# Patient Record
Sex: FEMALE | Race: WHITE | NOT HISPANIC OR LATINO | Employment: OTHER | URBAN - METROPOLITAN AREA
[De-identification: names, ages, dates, MRNs, and addresses within clinical notes are randomized per-mention and may not be internally consistent; named-entity substitution may affect disease eponyms.]

---

## 2017-01-12 ENCOUNTER — GENERIC CONVERSION - ENCOUNTER (OUTPATIENT)
Dept: OTHER | Facility: OTHER | Age: 62
End: 2017-01-12

## 2017-02-07 ENCOUNTER — GENERIC CONVERSION - ENCOUNTER (OUTPATIENT)
Dept: OTHER | Facility: OTHER | Age: 62
End: 2017-02-07

## 2017-02-20 ENCOUNTER — GENERIC CONVERSION - ENCOUNTER (OUTPATIENT)
Dept: OTHER | Facility: OTHER | Age: 62
End: 2017-02-20

## 2017-02-21 ENCOUNTER — ALLSCRIPTS OFFICE VISIT (OUTPATIENT)
Dept: OTHER | Facility: OTHER | Age: 62
End: 2017-02-21

## 2017-03-18 ENCOUNTER — OFFICE VISIT (OUTPATIENT)
Dept: URGENT CARE | Facility: MEDICAL CENTER | Age: 62
End: 2017-03-18
Payer: COMMERCIAL

## 2017-03-18 ENCOUNTER — HOSPITAL ENCOUNTER (EMERGENCY)
Facility: HOSPITAL | Age: 62
Discharge: HOME | End: 2017-03-18
Attending: EMERGENCY MEDICINE
Payer: COMMERCIAL

## 2017-03-18 VITALS
RESPIRATION RATE: 18 BRPM | TEMPERATURE: 98.2 F | HEART RATE: 64 BPM | DIASTOLIC BLOOD PRESSURE: 77 MMHG | WEIGHT: 128 LBS | SYSTOLIC BLOOD PRESSURE: 147 MMHG | OXYGEN SATURATION: 97 %

## 2017-03-18 DIAGNOSIS — N39.0 ACUTE URINARY TRACT INFECTION: ICD-10-CM

## 2017-03-18 DIAGNOSIS — R19.7 ACUTE DIARRHEA: Primary | ICD-10-CM

## 2017-03-18 DIAGNOSIS — E86.0 DEHYDRATION: ICD-10-CM

## 2017-03-18 LAB
ALBUMIN SERPL BCP-MCNC: 3.8 G/DL (ref 3.5–5)
ALP SERPL-CCNC: 69 U/L (ref 46–116)
ALT SERPL W P-5'-P-CCNC: 21 U/L (ref 12–78)
ANION GAP SERPL CALCULATED.3IONS-SCNC: 9 MMOL/L (ref 4–13)
AST SERPL W P-5'-P-CCNC: 19 U/L (ref 5–45)
BACTERIA UR QL AUTO: ABNORMAL /HPF
BASOPHILS # BLD AUTO: 0.01 THOUSANDS/ΜL (ref 0–0.1)
BASOPHILS NFR BLD AUTO: 0 % (ref 0–1)
BILIRUB SERPL-MCNC: 0.38 MG/DL (ref 0.2–1)
BILIRUB UR QL STRIP: NEGATIVE
BUN SERPL-MCNC: 13 MG/DL (ref 5–25)
CALCIUM SERPL-MCNC: 8.3 MG/DL (ref 8.3–10.1)
CHLORIDE SERPL-SCNC: 104 MMOL/L (ref 100–108)
CLARITY UR: CLEAR
CO2 SERPL-SCNC: 26 MMOL/L (ref 21–32)
COLOR UR: YELLOW
COLOR, POC: YELLOW
CREAT SERPL-MCNC: 0.87 MG/DL (ref 0.6–1.3)
EOSINOPHIL # BLD AUTO: 0.03 THOUSAND/ΜL (ref 0–0.61)
EOSINOPHIL NFR BLD AUTO: 1 % (ref 0–6)
ERYTHROCYTE [DISTWIDTH] IN BLOOD BY AUTOMATED COUNT: 13 % (ref 11.6–15.1)
GFR SERPL CREATININE-BSD FRML MDRD: >60 ML/MIN/1.73SQ M
GLUCOSE SERPL-MCNC: 74 MG/DL (ref 65–140)
GLUCOSE UR STRIP-MCNC: NEGATIVE MG/DL
HCT VFR BLD AUTO: 39.5 % (ref 34.8–46.1)
HGB BLD-MCNC: 14 G/DL (ref 11.5–15.4)
HGB UR QL STRIP.AUTO: NEGATIVE
KETONES UR STRIP-MCNC: NEGATIVE MG/DL
LACTATE SERPL-SCNC: 1.1 MMOL/L (ref 0.5–2)
LEUKOCYTE ESTERASE UR QL STRIP: ABNORMAL
LIPASE SERPL-CCNC: 141 U/L (ref 73–393)
LYMPHOCYTES # BLD AUTO: 2.06 THOUSANDS/ΜL (ref 0.6–4.47)
LYMPHOCYTES NFR BLD AUTO: 38 % (ref 14–44)
MCH RBC QN AUTO: 34.2 PG (ref 26.8–34.3)
MCHC RBC AUTO-ENTMCNC: 35.4 G/DL (ref 31.4–37.4)
MCV RBC AUTO: 97 FL (ref 82–98)
MONOCYTES # BLD AUTO: 0.45 THOUSAND/ΜL (ref 0.17–1.22)
MONOCYTES NFR BLD AUTO: 8 % (ref 4–12)
NEUTROPHILS # BLD AUTO: 2.83 THOUSANDS/ΜL (ref 1.85–7.62)
NEUTS SEG NFR BLD AUTO: 53 % (ref 43–75)
NITRITE UR QL STRIP: POSITIVE
NON-SQ EPI CELLS URNS QL MICRO: ABNORMAL /HPF
NRBC BLD AUTO-RTO: 0 /100 WBCS
PH UR STRIP.AUTO: 6 [PH] (ref 4.5–8)
PLATELET # BLD AUTO: 205 THOUSANDS/UL (ref 149–390)
PMV BLD AUTO: 10.5 FL (ref 8.9–12.7)
POTASSIUM SERPL-SCNC: 3.9 MMOL/L (ref 3.5–5.3)
PROT SERPL-MCNC: 7 G/DL (ref 6.4–8.2)
PROT UR STRIP-MCNC: NEGATIVE MG/DL
RBC # BLD AUTO: 4.09 MILLION/UL (ref 3.81–5.12)
RBC #/AREA URNS AUTO: ABNORMAL /HPF
SODIUM SERPL-SCNC: 139 MMOL/L (ref 136–145)
SP GR UR STRIP.AUTO: 1.01 (ref 1–1.03)
UROBILINOGEN UR QL STRIP.AUTO: 0.2 E.U./DL
WBC # BLD AUTO: 5.38 THOUSAND/UL (ref 4.31–10.16)
WBC #/AREA URNS AUTO: ABNORMAL /HPF

## 2017-03-18 PROCEDURE — 99285 EMERGENCY DEPT VISIT HI MDM: CPT

## 2017-03-18 PROCEDURE — G0382 LEV 3 HOSP TYPE B ED VISIT: HCPCS

## 2017-03-18 PROCEDURE — 83690 ASSAY OF LIPASE: CPT | Performed by: EMERGENCY MEDICINE

## 2017-03-18 PROCEDURE — 87086 URINE CULTURE/COLONY COUNT: CPT

## 2017-03-18 PROCEDURE — 87186 SC STD MICRODIL/AGAR DIL: CPT

## 2017-03-18 PROCEDURE — 83605 ASSAY OF LACTIC ACID: CPT | Performed by: EMERGENCY MEDICINE

## 2017-03-18 PROCEDURE — S9083 URGENT CARE CENTER GLOBAL: HCPCS

## 2017-03-18 PROCEDURE — 87077 CULTURE AEROBIC IDENTIFY: CPT

## 2017-03-18 PROCEDURE — 85025 COMPLETE CBC W/AUTO DIFF WBC: CPT | Performed by: EMERGENCY MEDICINE

## 2017-03-18 PROCEDURE — 93005 ELECTROCARDIOGRAM TRACING: CPT | Performed by: EMERGENCY MEDICINE

## 2017-03-18 PROCEDURE — 96361 HYDRATE IV INFUSION ADD-ON: CPT

## 2017-03-18 PROCEDURE — 81002 URINALYSIS NONAUTO W/O SCOPE: CPT | Performed by: EMERGENCY MEDICINE

## 2017-03-18 PROCEDURE — 81001 URINALYSIS AUTO W/SCOPE: CPT

## 2017-03-18 PROCEDURE — 36415 COLL VENOUS BLD VENIPUNCTURE: CPT | Performed by: EMERGENCY MEDICINE

## 2017-03-18 PROCEDURE — 96360 HYDRATION IV INFUSION INIT: CPT

## 2017-03-18 PROCEDURE — 80053 COMPREHEN METABOLIC PANEL: CPT | Performed by: EMERGENCY MEDICINE

## 2017-03-18 RX ORDER — CIPROFLOXACIN 500 MG/1
500 TABLET, FILM COATED ORAL 2 TIMES DAILY
Qty: 6 TABLET | Refills: 0 | Status: SHIPPED | OUTPATIENT
Start: 2017-03-18 | End: 2017-03-21

## 2017-03-18 RX ADMIN — SODIUM CHLORIDE 1000 ML: 0.9 INJECTION, SOLUTION INTRAVENOUS at 13:39

## 2017-03-19 LAB
ATRIAL RATE: 53 BPM
P AXIS: 35 DEGREES
PR INTERVAL: 146 MS
QRS AXIS: -4 DEGREES
QRSD INTERVAL: 108 MS
QT INTERVAL: 448 MS
QTC INTERVAL: 420 MS
T WAVE AXIS: 14 DEGREES
VENTRICULAR RATE: 53 BPM

## 2017-03-20 LAB — BACTERIA UR CULT: NORMAL

## 2017-03-28 ENCOUNTER — ALLSCRIPTS OFFICE VISIT (OUTPATIENT)
Dept: OTHER | Facility: OTHER | Age: 62
End: 2017-03-28

## 2017-03-28 ENCOUNTER — APPOINTMENT (OUTPATIENT)
Dept: LAB | Facility: HOSPITAL | Age: 62
End: 2017-03-28
Payer: COMMERCIAL

## 2017-03-28 DIAGNOSIS — N39.0 URINARY TRACT INFECTION: ICD-10-CM

## 2017-03-28 LAB
BILIRUB UR QL STRIP: NORMAL
CLARITY UR: NORMAL
COLOR UR: YELLOW
GLUCOSE (HISTORICAL): NORMAL
HGB UR QL STRIP.AUTO: NORMAL
KETONES UR STRIP-MCNC: NORMAL MG/DL
LEUKOCYTE ESTERASE UR QL STRIP: NORMAL
NITRITE UR QL STRIP: NORMAL
PH UR STRIP.AUTO: 5 [PH]
PROT UR STRIP-MCNC: NORMAL MG/DL
SP GR UR STRIP.AUTO: 1020
UROBILINOGEN UR QL STRIP.AUTO: NORMAL

## 2017-03-28 PROCEDURE — 87086 URINE CULTURE/COLONY COUNT: CPT

## 2017-03-28 PROCEDURE — 87186 SC STD MICRODIL/AGAR DIL: CPT

## 2017-03-28 PROCEDURE — 87077 CULTURE AEROBIC IDENTIFY: CPT

## 2017-03-30 LAB — BACTERIA UR CULT: NORMAL

## 2017-04-04 ENCOUNTER — TRANSCRIBE ORDERS (OUTPATIENT)
Dept: ADMINISTRATIVE | Facility: HOSPITAL | Age: 62
End: 2017-04-04

## 2017-04-04 ENCOUNTER — HOSPITAL ENCOUNTER (OUTPATIENT)
Dept: RADIOLOGY | Facility: MEDICAL CENTER | Age: 62
Discharge: HOME | End: 2017-04-04
Payer: COMMERCIAL

## 2017-04-04 DIAGNOSIS — A04.72 INTESTINAL INFECTION DUE TO CLOSTRIDIUM DIFFICILE: Primary | ICD-10-CM

## 2017-04-04 DIAGNOSIS — A04.72 INTESTINAL INFECTION DUE TO CLOSTRIDIUM DIFFICILE: ICD-10-CM

## 2017-04-04 PROCEDURE — 74020 HB X-RAY EXAM OF ABDOMEN (COMPLETE, WITH DECUBITUS/ERECT VIEWS): CPT

## 2017-04-07 ENCOUNTER — GENERIC CONVERSION - ENCOUNTER (OUTPATIENT)
Dept: OTHER | Facility: OTHER | Age: 62
End: 2017-04-07

## 2017-04-11 ENCOUNTER — GENERIC CONVERSION - ENCOUNTER (OUTPATIENT)
Dept: OTHER | Facility: OTHER | Age: 62
End: 2017-04-11

## 2017-06-06 ENCOUNTER — ALLSCRIPTS OFFICE VISIT (OUTPATIENT)
Dept: OTHER | Facility: OTHER | Age: 62
End: 2017-06-06

## 2017-06-06 DIAGNOSIS — E78.00 PURE HYPERCHOLESTEROLEMIA: ICD-10-CM

## 2017-06-06 DIAGNOSIS — F32.9 MAJOR DEPRESSIVE DISORDER, SINGLE EPISODE: ICD-10-CM

## 2017-06-06 DIAGNOSIS — D75.89 OTHER SPECIFIED DISEASES OF BLOOD AND BLOOD-FORMING ORGANS(289.89): ICD-10-CM

## 2017-06-29 ENCOUNTER — GENERIC CONVERSION - ENCOUNTER (OUTPATIENT)
Dept: OTHER | Facility: OTHER | Age: 62
End: 2017-06-29

## 2017-07-28 ENCOUNTER — GENERIC CONVERSION - ENCOUNTER (OUTPATIENT)
Dept: OTHER | Facility: OTHER | Age: 62
End: 2017-07-28

## 2017-09-06 ENCOUNTER — ALLSCRIPTS OFFICE VISIT (OUTPATIENT)
Dept: OTHER | Facility: OTHER | Age: 62
End: 2017-09-06

## 2017-11-06 ENCOUNTER — ALLSCRIPTS OFFICE VISIT (OUTPATIENT)
Dept: OTHER | Facility: OTHER | Age: 62
End: 2017-11-06

## 2017-11-07 NOTE — PROGRESS NOTES
Assessment  1  MDD (major depressive disorder), recurrent episode (296 30) (F33 9)   2  Microscopic colitis (558 9) (C32 049)    Plan  MDD (major depressive disorder), recurrent episode    · Follow-up visit in 4 Months Evaluation and Treatment  Physical  Status: Hold For - Scheduling   Requested for: [id number]    Discussion/Summary  Discussion Summary:   MDD - mild in severity  Now off on mirtazapine  She is motivated to discontinue sertraline 50mg daily  Advised decreasing dose to 25mg daily for two months then every other day for one month then every third day for one month then stop  Will see in 4 months  Call in between with any issues  microscopic colitis - continues to do well off budesonide  She has bloating that is improving off welchol  She remains on FODMAP diet and probiotics  She will follow up with [facility]P  Medication SE Review and Pt Understands Tx: Possible side effects of new medications were reviewed with the patient/guardian today  The treatment plan was reviewed with the patient/guardian  The patient/guardian understands and agrees with the treatment plan      Chief Complaint  Chief Complaint Chronic Condition [facility]: Patient is here today for follow up of chronic conditions described in HPI  History of Present Illness  HPI: 64yo female with depression, HLD, lumbar spondylosis, microscopic colitis here for follow up of depression  dose of mirtazapine was two days ago  She is still interested in tapering off sertraline  Reports she had difficulty sleeping initially when she was taking mirtazapine every other day but it has since improved  Appetite has not been affected  She gets HA but believes it is due to the weather  is off welchol and bloating has improved  She remains on a probiotic  Review of Systems  Complete-Female:   Constitutional: No fever, no chills, feels well, no tiredness, no recent weight gain or weight loss     Cardiovascular: No complaints of slow heart rate, no fast heart rate, no chest pain, no palpitations, no leg claudication, no lower extremity edema  Respiratory: No complaints of shortness of breath, no wheezing, no cough, no SOB on exertion, no orthopnea, no PND  Gastrointestinal: as noted in HPI  Genitourinary: No complaints of dysuria, no incontinence, no pelvic pain, no dysmenorrhea, no vaginal discharge or bleeding  Neurological: headache, but-- no dizziness  Psychiatric: as noted in HPI  Active Problems  1  Basal cell carcinoma of skin (173 91) (C44 91)   2  Hypercholesterolemia (272 0) (E78 00)   3  Macrocytosis (289 89) (D75 89)   4  MDD (major depressive disorder), recurrent episode (296 30) (F33 9)   5  Microscopic colitis (558 9) (K52 839)   6  Migraine headache (346 90) (G43 909)   7  Need for influenza vaccination (V04 81) (Z23)   8  Right bundle branch block (426 4) (I45 10)    Past Medical History  1  History of Blister of left heel, initial encounter (917 2) (E33 353O)   2  History of C  difficile diarrhea (008 45) (A04 72)   3  History of Chronic diarrhea (787 91) (K52 9)   4  Diarrhea (787 91) (R19 7)   5  History of Elevated CEA (795 81) (R97 0)   6  History of Fracture of wrist, left, sequela (905 2) (S62 102S)   7  History of Frequent bowel movements (787 99) (R19 4)   8  History of diarrhea (V12 79) (Z87 898)   9  History of dizziness (V13 89) (Z87 898)   10  History of gastroesophageal reflux (GERD) (V12 79) (Z87 19)   11  History of reactive airway disease (V12 69) (Z87 09)   12  History of rotator cuff tear (V13 59) (Z87 39)   13  History of Sleep disorder (780 50) (G47 9)   14  History of UTI due to Klebsiella species (599 0,041 3) (N39 0,B96 1)  Active Problems And Past Medical History Reviewed: The active problems and past medical history were reviewed and updated today  Surgical History  1  History of Biopsy Breast Percutaneous Needle Core   2  History of Complete Colonoscopy   3  History of Dilation And Curettage   4  History of Reported Hx Of Breast Surgery For Biopsy   5  History of Rotator Cuff Repair   6  History of Treatment Of Wrist Fracture  Surgical History Reviewed: The surgical history was reviewed and updated today  Family History  Mother    1  Family history of cardiac disorder (V17 49) (Z82 49)  Father    2  Family history of cardiac disorder (V17 49) (Z82 49)   3  Family history of congestive heart failure (V17 49) (Z82 49)   4  Family history of Stroke Syndrome (V17 1)  Family History Reviewed: The family history was reviewed and updated today  Social History   · Exercising Regularly   · Marital History - Currently    · Never A Smoker   · Occupation   · Retired From Work   · Social alcohol use (Z78 9)  Social History Reviewed: The social history was reviewed and is unchanged  Current Meds   1  Mirtazapine 7 5 MG Oral Tablet; Take 1 tablet by mouth at bedtime; Therapy: 09Aqm6193 to (Evaluate:22Uft8145)  Requested for: 01Dbx2849; Last Rx:94Gdu8479   Ordered   2  ProAir  (90 Base) MCG/ACT Inhalation Aerosol Solution; INHALE 2 PUFFS EVERY 4-6   HOURS, SPACED 60 SECONDS APART; Therapy: 48FPW1729 to (Evaluate:48Ego0453)  Requested for: 69Ipe7619; Last Rx:07Wpj4971   Ordered   3  Probiotic Oral Capsule; take 1 capsule daily; Therapy: (Recorded:87Oni1015) to Recorded   4  Sertraline HCl - 50 MG Oral Tablet; TAKE 1 TABLET DAILY AS DIRECTED; Therapy: (94 31 11) to  Requested for: 17Npj6389 Recorded  Medication List Reviewed: The medication list was reviewed and updated today  Allergies  1  Cyclobenzaprine HCl TABS   2  Morphine Sulfate (PF) SOLN   3  Flagyl   4   FLU    Vitals  Vital Signs    Recorded: 98FFS7318 07:59AM   Temperature 98 F   Heart Rate 72   Respiration 16   Systolic 471   Diastolic 70   Height 4 ft 11 in   Weight 130 lb 8 0 oz   BMI Calculated 26 36   BSA Calculated 1 54   O2 Saturation 97     Physical Exam    Constitutional   General appearance: No acute distress, well appearing and well nourished  Ears, Nose, Mouth, and Throat   Oropharynx: Normal with no erythema, edema, exudate or lesions  Pulmonary   Respiratory effort: No increased work of breathing or signs of respiratory distress  Auscultation of lungs: Clear to auscultation  Cardiovascular   Auscultation of heart: Normal rate and rhythm, normal S1 and S2, without murmurs  Examination of extremities for edema and/or varicosities: Normal  -- post tib 2/4 bilaterally  Abdomen   Abdomen: Non-tender, no masses  Neurologic No focal deficit  Psychiatric   Orientation to person, place, and time: Normal     Mood and affect: Normal          Results/Data  PHQ-9 Adult Depression Screening 81YSD6191 08:17AM Jessica Jay Jay     Test Name Result Flag Reference   PHQ-9 Adult Depression Score 5     Over the last two weeks, how often have you been bothered by any of the following problems? Little interest or pleasure in doing things: Several days - 1  Feeling down, depressed, or hopeless: Several days - 1  Trouble falling or staying asleep, or sleeping too much: Several days - 1  Feeling tired or having little energy: Several days - 1  Poor appetite or over eating: Not at all - 0  Feeling bad about yourself - or that you are a failure or have let yourself or your family down: Several days - 1  Trouble concentrating on things, such as reading the newspaper or watching television: Not at all - 0  Moving or speaking so slowly that other people could have noticed   Or the opposite -  being so fidgety or restless that you have been moving around a lot more than usual: Not at all - 0  Thoughts that you would be better off dead, or of hurting yourself in some way: Not at all - 0   PHQ-9 Adult Depression Screening Negative     PHQ-9 Difficulty Level Somewhat difficult     PHQ-9 Severity Mild Depression       Signatures   Electronically signed by : Isak Schwab DO; Nov 6 2017  8:20AM EST (Author)

## 2018-01-09 ENCOUNTER — GENERIC CONVERSION - ENCOUNTER (OUTPATIENT)
Dept: INTERNAL MEDICINE CLINIC | Facility: CLINIC | Age: 63
End: 2018-01-09

## 2018-01-13 VITALS
OXYGEN SATURATION: 97 % | TEMPERATURE: 98 F | BODY MASS INDEX: 26.31 KG/M2 | SYSTOLIC BLOOD PRESSURE: 108 MMHG | HEIGHT: 59 IN | DIASTOLIC BLOOD PRESSURE: 70 MMHG | RESPIRATION RATE: 16 BRPM | HEART RATE: 72 BPM | WEIGHT: 130.5 LBS

## 2018-01-13 VITALS
DIASTOLIC BLOOD PRESSURE: 80 MMHG | RESPIRATION RATE: 16 BRPM | HEIGHT: 59 IN | SYSTOLIC BLOOD PRESSURE: 115 MMHG | BODY MASS INDEX: 25.83 KG/M2 | TEMPERATURE: 97.5 F | OXYGEN SATURATION: 98 % | HEART RATE: 60 BPM | WEIGHT: 128.13 LBS

## 2018-01-13 VITALS
OXYGEN SATURATION: 96 % | HEART RATE: 69 BPM | SYSTOLIC BLOOD PRESSURE: 105 MMHG | TEMPERATURE: 97.4 F | HEIGHT: 59 IN | WEIGHT: 133.13 LBS | BODY MASS INDEX: 26.84 KG/M2 | DIASTOLIC BLOOD PRESSURE: 60 MMHG | RESPIRATION RATE: 16 BRPM

## 2018-01-14 VITALS
OXYGEN SATURATION: 97 % | SYSTOLIC BLOOD PRESSURE: 130 MMHG | HEIGHT: 59 IN | TEMPERATURE: 97.7 F | WEIGHT: 131 LBS | RESPIRATION RATE: 16 BRPM | HEART RATE: 75 BPM | BODY MASS INDEX: 26.41 KG/M2 | DIASTOLIC BLOOD PRESSURE: 90 MMHG

## 2018-01-14 VITALS
OXYGEN SATURATION: 98 % | TEMPERATURE: 98.3 F | HEART RATE: 84 BPM | BODY MASS INDEX: 26.05 KG/M2 | SYSTOLIC BLOOD PRESSURE: 118 MMHG | DIASTOLIC BLOOD PRESSURE: 80 MMHG | WEIGHT: 129 LBS | RESPIRATION RATE: 12 BRPM

## 2018-01-17 NOTE — MISCELLANEOUS
Message   Recorded as Task   Date: 11/14/2016 09:02 AM, Created By: aYima Berry   Task Name: Medical Complaint Callback   Assigned To: Nesha Ellison   Regarding Patient: Zackary Morse, Status: Active   Comment:    Tawny Marshall - 14 Nov 2016 9:02 AM     TASK CREATED  pt is having alot of gi issues she would like you to call and refer her to [facility] to [facility] drs but her family  has ti call and refer her pt can be reached at [phone] and [facility] referal line is [phone]   Los Kwan - 15 Nov 2016 4:12 PM     TASK EDITED  Pt called for update  Patient reports she has had chronic diarrhea and has been followed by [facility] Dr Barbara Osei, last visit 11/2/16  Reports labs and stool tests were ordered  [facility] nurse called and reported she had an infection and was prescribed Flagyl and CIpro which she has not taken because she had adverse reactions to both in the past  Patient looked on her lab portal and reports she has positive C dif  She is not on abx at this time  Plan  Advised will need to obtain report of labs from [facility] and if in fact she is C dif positive will need to be on Vanco due to reaction from Flagyl (cramping and diarrhea)  Will also need to contact her gastro to make aware of results of tests that they had ordered       Signatures   Electronically signed by : Erin King DO; Nov 16 2016  5:18PM EST                       (Author)

## 2018-01-17 NOTE — PROGRESS NOTES
Assessment    1  Encounter for preventive health examination (V70 0) (Z00 00)    Plan  Health Maintenance    · Alcohol misuse can be a problem with advancing age ; Status:Complete;   Done:  68ZAW2903   · Drink plenty of fluids ; Status:Complete;   Done: 97HET8756   · Eat a low fat and low cholesterol diet ; Status:Complete;   Done: 80DQB5319   · Some eating tips that can help you lose weight ; Status:Complete;   Done: 13NFZ0466   · We recommend that you follow these steps to lower your risk of osteoporosis  ;  Status:Complete;   Done: 38QWU0284  Macrocytosis    · Follow-up visit in 6 months Evaluation and Treatment  Follow-up  Status: Hold For -  Scheduling  Requested for: 39Iks1687    Discussion/Summary  health maintenance visit healthy adult female Currently, she eats a healthy diet and has an adequate exercise regimen  cervical cancer screening is managed by LAURA Odonnell Breast cancer screening: the risks and benefits of breast cancer screening were discussed, monthly self breast exam was advised, mammogram is current and breast cancer screening is managed by Estela Hendrix  Colorectal cancer screening: the risks and benefits of colorectal cancer screening were discussed, colorectal cancer screening is current, the next colonoscopy is due 2024 and colorectal cancer screening is managed by [facility] Dr Doroteo Lee  The risks and benefits of immunizations were discussed, immunizations are needed and pt to obtain shingles vaccine at local pharmacy  Advice and education were given regarding nutrition, aerobic exercise, weight bearing exercise, weight loss, calcium supplements, vitamin D supplements, cardiovascular risk reduction and alcohol use  Patient discussion: discussed with the patient, 30 minute visit, greater than half of the time was spent on counseling  History of Present Illness  , Adult Female: The patient is being seen for a health maintenance evaluation  The last health maintenance visit was 2 year(s) ago  Social History: Household members include spouse  She is   Work status: occupation: Superintendent and retired  The patient has never smoked cigarettes  General Health: The patient's health since the last visit is described as good  She has regular dental visits (Followed by Dr Mounika Ledezma)  The patient brushes time(s) a day, flosses time(s) a day and reports her last dental visit was 2 weeks ago  She complains of vision problems (Followed by Dr Ethan Powell)  Vision care includes wearing glasses and an eye examination within the last year  She has hearing loss (Followed by ENT Dr Angela Tony)  hearing is slightly decreased   She doesn't wear a hearing aid  Immunizations status: not up to date The patient needs the following immunization(s): zoster vaccine  Lifestyle:  She consumes a diverse and healthy diet  (bland diet, eats chicken, broiled fish, no spicy foods)   She exercises regularly  She exercises 3 or more times per week  Exercise includes walking, aerobic conditioning, strength training and also walks her dog 4x a day  She does not use tobacco  She consumes alcohol  She reports occasional alcohol use and drinking 1 drinks per day  She typically drinks wine  Reproductive health: the patient is postmenopausal   Became postmenopausal at age 53yo  Screening: Breast cancer screening includes a mammogram performed 2/8/16 and Followed by Dr Enma Mgcowan  Colorectal cancer screening includes a colonoscopy performed 10/1/2014 and Followed by [facility] Dr Julianna Abdi, next colonoscopy due 10/2024  Metabolic screening includes lipid profile performed 5/17/16, glucose screening performed 5/17/16, thyroid function test performed 5/17/16 and DEXA performed 2/2016  Safety elements used: seat belt, bicycle helmet, sunscreen and smoke detector  HPI: 64yo female here with h/o depression, macrocytosis, diet controlled HLD and frequent BMs here for yearly physical  Reports she is sleeping better on mirtazapine   She was switched back to sertraline from lexapro but dose was lowered to 100mg once daily  Overall she feels well but continues to have GI issues  Review of Systems    Constitutional: No fever, no chills, feels well, no tiredness, no recent weight gain or weight loss  Cardiovascular: No complaints of slow heart rate, no fast heart rate, no chest pain, no palpitations, no leg claudication, no lower extremity edema  Respiratory: No complaints of shortness of breath, no wheezing, no cough, no SOB on exertion, no orthopnea, no PND  Gastrointestinal: abdominal pain and heartburn  Genitourinary: No complaints of dysuria, no incontinence, no pelvic pain, no dysmenorrhea, no vaginal discharge or bleeding  Musculoskeletal: as noted in HPI  Neurological: numbness and tingling  Psychiatric: as noted in HPI  Active Problems    1  Basal cell carcinoma of skin (173 91) (C44 91)   2  Depression (311) (F32 9)   3  Elevated CEA (795 81) (R97 0)   4  Frequent bowel movements (787 99) (R19 4)   5  Hypercholesterolemia (272 0) (E78 0)   6  Lumbar strain (847 2) (S39 012A)   7  Macrocytosis (289 89) (D75 89)   8  Migraine headache (346 90) (G43 909)   9  Reactive airway disease (493 90) (J45 909)   10  Right bundle branch block (426 4) (I45 10)   11   Sleep disorder (780 50) (G47 9)    Past Medical History    · Diarrhea (787 91) (R19 7)   · History of Fracture of wrist, left, sequela (905 2) (S62 102S)   · History of gastroesophageal reflux (GERD) (V12 79) (Z87 19)   · History of rotator cuff tear (V13 59) (Z87 39)    Surgical History    · History of Biopsy Breast Percutaneous Needle Core   · History of Complete Colonoscopy   · History of Dilation And Curettage   · History of Reported Hx Of Breast Surgery For Biopsy   · History of Rotator Cuff Repair   · History of Treatment Of Wrist Fracture    Family History  Mother    · Family history of cardiac disorder (V17 49) (Z80 55)  Father    · Family history of cardiac disorder (V17 49) (Z82 49)   · Family history of congestive heart failure (V17 49) (Z82 49)   · Family history of Stroke Syndrome (V17 1)    Social History    · Exercising Regularly   · Marital History - Currently    · Never A Smoker   · Occupation   ·    · Retired From Work   · Social alcohol use (Z78 9)   · wine - 4-5 glasses a week    Current Meds   1  Mirtazapine 15 MG Oral Tablet Recorded   2  ProAir  (90 Base) MCG/ACT Inhalation Aerosol Solution; INHALE 2 PUFFS   EVERY 4-6 HOURS, SPACED 60 SECONDS APART; Therapy: 86BJN9023 to (Evaluate:11May2016)  Requested for: 11Apr2016; Last   Rx:11Apr2016 Ordered   3  Probiotic Oral Capsule; Therapy: (Recorded:10May2016) to Recorded   4  Protonix 40 MG Oral Tablet Delayed Release; Therapy: (Recorded:31Oct2015) to Recorded   5  Sertraline HCl - 100 MG Oral Tablet; TAKE 1 TABLET DAILY; Therapy: (Recorded:19Aug2016) to Recorded    Allergies    1  Cyclobenzaprine HCl TABS   2  Morphine Sulfate (PF) SOLN   3  FLU    Vitals   Recorded: 19Aug2016 07:50AM Recorded: 82SUE5605 58:20KG   Systolic 056 mm Hg, LUE, Sitting    Diastolic 82 mm Hg, LUE, Sitting    Heart Rate 60    Height  4 ft 11 in   Weight  130 lb 6 oz   BMI Calculated  26 33 kg/m2   BSA Calculated  1 54 m2     Physical Exam    Constitutional   General appearance: No acute distress, well appearing and well nourished  Head and Face wears glasses  Eyes   Conjunctiva and lids: No swelling, erythema or discharge  Ears, Nose, Mouth, and Throat   External inspection of ears and nose: Normal     Otoscopic examination: Tympanic membranes translucent with normal light reflex  Canals patent without erythema  Hearing: Normal     Nasal mucosa, septum, and turbinates: Normal without edema or erythema  Lips, teeth, and gums: Normal, good dentition  Oropharynx: Normal with no erythema, edema, exudate or lesions  Neck   Neck: Supple, symmetric, trachea midline, no masses  Thyroid: Normal, no thyromegaly  Pulmonary   Respiratory effort: No increased work of breathing or signs of respiratory distress  Auscultation of lungs: Clear to auscultation  Cardiovascular   Auscultation of heart: Normal rate and rhythm, normal S1 and S2, no murmurs  Carotid pulses: 2+ bilaterally  Pedal pulses: 2+ bilaterally  Examination of extremities for edema and/or varicosities: Normal     Chest deferred by patient  Abdomen   Abdomen: Non-tender, no masses  The abdomen was flat  Bowel sounds were normal  The abdomen was soft and nontender  The abdomen was normal to percussion  Lymphatic   Palpation of lymph nodes in neck: No lymphadenopathy  Neurologic No focal deficit  Psychiatric   Orientation to person, place, and time: Normal     Mood and affect: Normal        Results/Data  PHQ-9 Adult Depression Screening 23Jhl0884 07:26AM User, [facility]     Test Name Result Flag Reference   PHQ-9 Adult Depression Score 7     Over the last two weeks, how often have you been bothered by any of the following problems? Little interest or pleasure in doing things: Several days - 1  Feeling down, depressed, or hopeless: Several days - 1  Trouble falling or staying asleep, or sleeping too much: More than half the days - 2  Feeling tired or having little energy: Several days - 1  Poor appetite or over eating: Not at all - 0  Feeling bad about yourself - or that you are a failure or have let yourself or your family down: More than half the days - 2  Trouble concentrating on things, such as reading the newspaper or watching television: Not at all - 0  Moving or speaking so slowly that other people could have noticed   Or the opposite -  being so fidgety or restless that you have been moving around a lot more than usual: Not at all - 0  Thoughts that you would be better off dead, or of hurting yourself in some way: Not at all - 0   PHQ-9 Adult Depression Screening Negative     PHQ-9 Difficulty Level Somewhat difficult     PHQ-9 Severity Mild Depression       PHQ-2 Adult Depression Screening 16Raq5179 07:25AM User, [facility]     Test Name Result Flag Reference   PHQ-2 Adult Depression Score 1     Over the last two weeks, how often have you been bothered by any of the following problems?   Little interest or pleasure in doing things: Not at all - 0  Feeling down, depressed, or hopeless: Several days - 1   PHQ-2 Adult Depression Screening Negative         Signatures   Electronically signed by : Nery Shay DO; Aug 19 2016  7:58AM EST                       (Author)

## 2018-01-17 NOTE — PSYCH
Behavioral Health Outpatient Intake    Referred By: DR Adali Ramirez  Intake Questions: there are no developmental disabilities  the patient does not have a hearing impairment  the patient does not have an ICM or CTT  patient is not taking injectable psychiatric medications  Employment: The patient is not employed  at [employer]  Emergency Contact Information:   Emergency Contact: MUSHTAQ HERNANDEZ   Relationship to Patient:    Phone Number: [phone]   Previous Psychiatric Treatment: She has not been previously seen by a psychiatrist     She has previously been seen by a therapist  Naveen Yanes 2014   History: no  service  She has not had combat service  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

## 2018-01-26 ENCOUNTER — TELEPHONE (OUTPATIENT)
Dept: INTERNAL MEDICINE CLINIC | Facility: CLINIC | Age: 63
End: 2018-01-26

## 2018-01-26 NOTE — TELEPHONE ENCOUNTER
[place]  She will back on Wednesday  She had a UA done at [facility] and they put her on an antibiotic  It didn't help and now they want her to see her primary care Dr   Because the original DR that prescribed the med is no longer there  She wants to know if you can prescribe another medication for her since she had a positive result?       Uses [facility] on [address]

## 2018-02-01 ENCOUNTER — OFFICE VISIT (OUTPATIENT)
Dept: URGENT CARE | Facility: MEDICAL CENTER | Age: 63
End: 2018-02-01
Payer: COMMERCIAL

## 2018-02-01 VITALS
HEART RATE: 76 BPM | DIASTOLIC BLOOD PRESSURE: 74 MMHG | OXYGEN SATURATION: 100 % | RESPIRATION RATE: 16 BRPM | WEIGHT: 126 LBS | TEMPERATURE: 97.3 F | SYSTOLIC BLOOD PRESSURE: 136 MMHG | HEIGHT: 59 IN | BODY MASS INDEX: 25.4 KG/M2

## 2018-02-01 DIAGNOSIS — R30.0 DYSURIA: Primary | ICD-10-CM

## 2018-02-01 LAB
SL AMB  POCT GLUCOSE, UA: NEGATIVE
SL AMB LEUKOCYTE ESTERASE,UA: NEGATIVE
SL AMB POCT BILIRUBIN,UA: NORMAL
SL AMB POCT BLOOD,UA: NORMAL
SL AMB POCT CLARITY,UA: NORMAL
SL AMB POCT COLOR,UA: YELLOW
SL AMB POCT KETONES,UA: NEGATIVE
SL AMB POCT NITRITE,UA: POSITIVE
SL AMB POCT PH,UA: 6
SL AMB POCT SPECIFIC GRAVITY,UA: 1.02

## 2018-02-01 PROCEDURE — 81002 URINALYSIS NONAUTO W/O SCOPE: CPT | Performed by: NURSE PRACTITIONER

## 2018-02-01 PROCEDURE — 87086 URINE CULTURE/COLONY COUNT: CPT | Performed by: NURSE PRACTITIONER

## 2018-02-01 PROCEDURE — 87186 SC STD MICRODIL/AGAR DIL: CPT | Performed by: NURSE PRACTITIONER

## 2018-02-01 PROCEDURE — 87077 CULTURE AEROBIC IDENTIFY: CPT | Performed by: NURSE PRACTITIONER

## 2018-02-01 PROCEDURE — 99214 OFFICE O/P EST MOD 30 MIN: CPT | Performed by: NURSE PRACTITIONER

## 2018-02-01 RX ORDER — SERTRALINE HYDROCHLORIDE 100 MG/1
50 TABLET, FILM COATED ORAL DAILY
COMMUNITY
End: 2018-02-05 | Stop reason: DRUGHIGH

## 2018-02-01 RX ORDER — NITROFURANTOIN 25; 75 MG/1; MG/1
100 CAPSULE ORAL 2 TIMES DAILY
Qty: 14 CAPSULE | Refills: 0 | Status: SHIPPED | OUTPATIENT
Start: 2018-02-01 | End: 2018-02-08

## 2018-02-01 RX ORDER — PHENAZOPYRIDINE HYDROCHLORIDE 100 MG/1
100 TABLET, FILM COATED ORAL 3 TIMES DAILY PRN
Qty: 10 TABLET | Refills: 0 | Status: SHIPPED | OUTPATIENT
Start: 2018-02-01 | End: 2018-02-03

## 2018-02-01 NOTE — PROGRESS NOTES
Assessment/Plan:  1  In office urinalysis + large blood and + nitrates  Previous urine culture + for klebsiella  2  Await urine culture  3  Follow up with PCP, Consider Urology referral if no improvement of symptoms  Patient Instructions   Maintain good hygiene  Encourage fluids and rest    May utilize Tylenol and Ibuprofen for discomfort  Complete course of antibiotics as prescribed  Be cautious pyridium will stain clothing and may change color of urine to orange  Await urine cultures  Follow up with PCP if symptoms persist              No problem-specific Assessment & Plan notes found for this encounter  Diagnoses and all orders for this visit:    Dysuria  -     POCT urine dip  -     nitrofurantoin (MACROBID) 100 mg capsule; Take 1 capsule (100 mg total) by mouth 2 (two) times a day for 7 days  -     phenazopyridine (PYRIDIUM) 100 mg tablet; Take 1 tablet (100 mg total) by mouth 3 (three) times a day as needed for bladder spasms for up to 2 days  -     Urine culture    Other orders  -     sertraline (ZOLOFT) 100 mg tablet; Take 50 mg by mouth daily          Subjective:      Patient ID: Gil Tineo is a 65 y o  female  Patient presents with urinary symptoms  Reports symptoms started prior to 01/08, was seen at [address] [place] [address], and had + UA with culture of klebsiella, was given one dose of monuril which she took on 1/12 with slight improvement of symptoms  Reports had repeat UA done on 01/18 and left for vacation to [facility], was then notified by physician office that that urine specimen was also + for klebsiella, though physician on call would not prescribe meds  Reports returned from [facility] yesterday and continues to be symptomatic including cloudy urine, frequency, urgency, lower abdominal discomfort, blood in urine  Denies burning, fevers, chills, N/V  + chronic diarrhea   GYN visit up to date and was reportedly normal          The following portions of the patient's history were reviewed and updated as appropriate: past social history, past surgical history and problem list     Review of Systems   Constitutional: Negative for chills and fever  Respiratory: Negative for chest tightness and shortness of breath  Cardiovascular: Negative for chest pain and leg swelling  Gastrointestinal: Positive for abdominal pain  Negative for diarrhea and nausea  Genitourinary: Positive for decreased urine volume, dysuria, frequency and hematuria  Negative for urgency  Musculoskeletal: Negative for myalgias  Skin: Negative for rash  Objective:     Physical Exam   Constitutional: She is oriented to person, place, and time  She appears well-developed and well-nourished  No distress  HENT:   Head: Normocephalic and atraumatic  Eyes: Conjunctivae are normal  Pupils are equal, round, and reactive to light  Cardiovascular: Normal rate, regular rhythm, normal heart sounds and intact distal pulses  No murmur heard  Pulmonary/Chest: Effort normal and breath sounds normal    Abdominal: Soft  Normal appearance and bowel sounds are normal  She exhibits no distension  There is no hepatosplenomegaly  There is tenderness in the suprapubic area  There is no rebound, no guarding and no CVA tenderness  Musculoskeletal: Normal range of motion  She exhibits no edema  Neurological: She is alert and oriented to person, place, and time  Skin: Skin is warm and dry  No rash noted  She is not diaphoretic  Psychiatric: She has a normal mood and affect  Nursing note and vitals reviewed          /74 (BP Location: Left arm, Patient Position: Sitting, Cuff Size: Standard)   Pulse 76   Temp (!) 97 3 °F (36 3 °C) (Tympanic)   Resp 16   Ht 4' 11" (1 499 m)   Wt 57 2 kg (126 lb)   SpO2 100%   BMI 25 45 kg/m²

## 2018-02-01 NOTE — PATIENT INSTRUCTIONS
Maintain good hygiene  Encourage fluids and rest    May utilize Tylenol and Ibuprofen for discomfort  Complete course of antibiotics as prescribed  Be cautious pyridium will stain clothing and may change color of urine to orange  Await urine cultures  Follow up with PCP if symptoms persist      Dysuria   WHAT YOU NEED TO KNOW:   Dysuria is difficulty urinating, or pain, burning, or discomfort with urination  Dysuria is usually a symptom of another problem  DISCHARGE INSTRUCTIONS:   Return to the emergency department if:   · You have severe back, side, or abdominal pain  · You have fever and shaking chills  · You vomit several times in a row  Contact your healthcare provider if:   · Your symptoms do not go away, even after treatment  · You have questions or concerns about your condition or care  Medicines:   · Medicines  may be given to help treat a bacterial infection or help decrease bladder spasms  · Take your medicine as directed  Contact your healthcare provider if you think your medicine is not helping or if you have side effects  Tell him of her if you are allergic to any medicine  Keep a list of the medicines, vitamins, and herbs you take  Include the amounts, and when and why you take them  Bring the list or the pill bottles to follow-up visits  Carry your medicine list with you in case of an emergency  Follow up with your healthcare provider as directed: Your healthcare provider may also refer you to a urologist or nephrologist to have additional testing  Write down your questions so you remember to ask them during your visits  Manage your dysuria:   · Drink more liquids  Liquids help flush out bacteria that may be causing an infection  Ask your healthcare provider how much liquid to drink each day and which liquids are best for you  · Take sitz baths as directed  Fill a bathtub with 4 to 6 inches of warm water   You may also use a sitz bath pan that fits over a toilet  Sit in the sitz bath for 20 minutes  Do this 2 to 3 times a day, or as directed  The warm water can help decrease pain and swelling  © 2017 [address] [employer] Information is for End User's use only and may not be sold, redistributed or otherwise used for commercial purposes  All illustrations and images included in CareNotes® are the copyrighted property of A D A M , Inc  or Orlando Sanabria  The above information is an  only  It is not intended as medical advice for individual conditions or treatments  Talk to your doctor, nurse or pharmacist before following any medical regimen to see if it is safe and effective for you

## 2018-02-03 LAB — BACTERIA UR CULT: ABNORMAL

## 2018-02-05 ENCOUNTER — OFFICE VISIT (OUTPATIENT)
Dept: INTERNAL MEDICINE CLINIC | Facility: CLINIC | Age: 63
End: 2018-02-05
Payer: COMMERCIAL

## 2018-02-05 VITALS
HEART RATE: 63 BPM | DIASTOLIC BLOOD PRESSURE: 70 MMHG | SYSTOLIC BLOOD PRESSURE: 128 MMHG | BODY MASS INDEX: 26.21 KG/M2 | WEIGHT: 130 LBS | OXYGEN SATURATION: 98 % | TEMPERATURE: 98.3 F | HEIGHT: 59 IN

## 2018-02-05 DIAGNOSIS — F33.9 EPISODE OF RECURRENT MAJOR DEPRESSIVE DISORDER, UNSPECIFIED DEPRESSION EPISODE SEVERITY (HCC): ICD-10-CM

## 2018-02-05 DIAGNOSIS — N39.0 UTI DUE TO KLEBSIELLA SPECIES: Primary | ICD-10-CM

## 2018-02-05 DIAGNOSIS — B96.89 UTI DUE TO KLEBSIELLA SPECIES: Primary | ICD-10-CM

## 2018-02-05 DIAGNOSIS — F33.9 EPISODE OF RECURRENT MAJOR DEPRESSIVE DISORDER, UNSPECIFIED DEPRESSION EPISODE SEVERITY (HCC): Primary | ICD-10-CM

## 2018-02-05 PROBLEM — N90.89 VULVAR IRRITATION: Status: ACTIVE | Noted: 2017-10-03

## 2018-02-05 PROBLEM — N64.9 BREAST DISORDER: Status: ACTIVE | Noted: 2018-02-05

## 2018-02-05 PROBLEM — K21.9 GERD (GASTROESOPHAGEAL REFLUX DISEASE): Status: ACTIVE | Noted: 2017-04-18

## 2018-02-05 PROBLEM — K52.839 MICROSCOPIC COLITIS: Status: ACTIVE | Noted: 2017-02-20

## 2018-02-05 PROBLEM — G47.00 INSOMNIA: Status: ACTIVE | Noted: 2018-02-05

## 2018-02-05 PROBLEM — Z78.0 POSTMENOPAUSAL: Status: ACTIVE | Noted: 2018-02-05

## 2018-02-05 PROCEDURE — 99214 OFFICE O/P EST MOD 30 MIN: CPT | Performed by: INTERNAL MEDICINE

## 2018-02-05 RX ORDER — SULFAMETHOXAZOLE AND TRIMETHOPRIM 800; 160 MG/1; MG/1
1 TABLET ORAL EVERY 12 HOURS SCHEDULED
Qty: 14 TABLET | Refills: 0 | Status: SHIPPED | OUTPATIENT
Start: 2018-02-05 | End: 2018-02-12

## 2018-02-05 NOTE — PROGRESS NOTES
Assessment/Plan:    MDD (major depressive disorder), recurrent episode ([facility] [address] )  Will continue on present dose of sertraline 50mg daily for now, med renewed  Reassess at next visit  1  UTI due to Klebsiella species  sulfamethoxazole-trimethoprim (BACTRIM DS) 800-160 mg per tablet    UC susceptibilities reviewed  Has h/o C dif, would avoid fluorquinolones, cephalosporin and PCN  D/c nitrofurantoin as it is intermediate susc  Start Bactrim  2  Episode of recurrent major depressive disorder, unspecified depression episode severity (HCC)  sertraline (ZOLOFT) 50 mg tablet       Subjective:      Patient ID: Moody Qiu is a 65 y o  female  HPI     Reports she did not decrease her dose of sertraline as originally planned  She remains on sertraline 50mg daily  She had a horrible holiday and had to put her dog down  She reports abd bloating, hematuria  UA showed Klebsiella, she was rx monuril  Had repeat UA done prior going to [facility], she was starting to feel better but while in [facility], she contacted [place] and was told it was positive again for Klebsiella but was not given any rx  When returning to [facility], she still had hematuria and therefore went to Urgent Care, urine was positive and rx nitrofurantoin four days ago  She was advised today that her UC was pos for Klebsiella  She still has lower abd bloating, hematuria but denies lower abd pain, dysuria, fever or nausea  The following portions of the patient's history were reviewed and updated as appropriate: allergies, current medications, past family history, past medical history, past social history, past surgical history and problem list     Review of Systems   Constitutional: Negative for fever  Respiratory: Negative  Cardiovascular: Negative  Genitourinary:        Hematuria   Psychiatric/Behavioral: Positive for dysphoric mood           Objective:  /70 (BP Location: Left arm, Patient Position: Sitting, Cuff Size: Adult)   Pulse 63 Temp 98 3 °F (36 8 °C) (Tympanic)   Ht 4' 11" (1 499 m)   Wt 59 kg (130 lb)   SpO2 98%   BMI 26 26 kg/m²        Physical Exam   Constitutional: She is oriented to person, place, and time  No distress  Cardiovascular: Normal rate and regular rhythm  Pulmonary/Chest: Effort normal    Abdominal: Soft  Bowel sounds are normal  She exhibits no distension  There is no tenderness  Neurological: She is oriented to person, place, and time  Psychiatric: She has a normal mood and affect  Vitals reviewed

## 2018-02-05 NOTE — ASSESSMENT & PLAN NOTE
Will continue on present dose of sertraline 50mg daily for now, med renewed  Reassess at next visit

## 2018-03-13 ENCOUNTER — VBI (OUTPATIENT)
Dept: ADMINISTRATIVE | Facility: OTHER | Age: 63
End: 2018-03-13

## 2018-03-13 NOTE — TELEPHONE ENCOUNTER
Drake Goldstein    ED Visit Information     Ed visit date: 2/26/18  Diagnosis Description: OTHER SPECIFIED ABNORMAL UTERINE AND VAGINAL BLEEDING  In Network? No  Discharge status: Home  Discharged with meds ? NA  Number of ED visits to date: 1  ED Severity:4     Outreach Information    Outreach successful: N/A  Date letter mailed:3/13/18  Date Finalized:3/13/18    Care Coordination    Follow up appointment with pcp: no none  Transportation issues ?  NA    [employer] type:  7 [address] PCP:  Yes

## 2018-04-05 ENCOUNTER — OFFICE VISIT (OUTPATIENT)
Dept: INTERNAL MEDICINE CLINIC | Facility: CLINIC | Age: 63
End: 2018-04-05
Payer: COMMERCIAL

## 2018-04-05 VITALS
WEIGHT: 131.8 LBS | DIASTOLIC BLOOD PRESSURE: 70 MMHG | SYSTOLIC BLOOD PRESSURE: 110 MMHG | HEART RATE: 64 BPM | RESPIRATION RATE: 16 BRPM | HEIGHT: 60 IN | BODY MASS INDEX: 25.87 KG/M2 | TEMPERATURE: 98.2 F | OXYGEN SATURATION: 97 %

## 2018-04-05 DIAGNOSIS — Z11.59 NEED FOR HEPATITIS C SCREENING TEST: ICD-10-CM

## 2018-04-05 DIAGNOSIS — Z00.00 ROUTINE ADULT HEALTH MAINTENANCE: Primary | ICD-10-CM

## 2018-04-05 DIAGNOSIS — E78.00 HYPERCHOLESTEROLEMIA: ICD-10-CM

## 2018-04-05 PROCEDURE — 99396 PREV VISIT EST AGE 40-64: CPT | Performed by: INTERNAL MEDICINE

## 2018-04-05 RX ORDER — ALBUTEROL SULFATE 90 UG/1
2 AEROSOL, METERED RESPIRATORY (INHALATION)
COMMUNITY
Start: 2011-03-09 | End: 2018-09-13 | Stop reason: HOSPADM

## 2018-04-05 NOTE — PROGRESS NOTES
Assessment/Plan:    No problem-specific Assessment & Plan notes found for this encounter  1  Routine adult health maintenance     2  Need for hepatitis C screening test  Hepatitis C antibody   3  Hypercholesterolemia  Lipid panel    TSH, 3rd generation with T4 reflex   surveillance DXA deferred    Subjective:      Patient ID: Seamus Avila is a 65 y o  female  HPI  62yo female with MDD, microscopic colitis, recurrent C dif, HLD and lumbar spondylosis here for yearly physical   She has weaned off sertraline  Last dental visit several weeks ago  Last eye visit 2 weeks ago, wears glasses    Immunization History   Administered Date(s) Administered    Influenza Quadrivalent, 6-35 Months IM 09/06/2017    Influenza TIV (IM) 11/06/2011, 09/23/2013, 09/23/2014    Tdap 11/17/2015       Lifestyle:    Diet she is gradually reintroducing vegetables  Physical activity goes to the gym twice weekly with , does classes with mobility and stretching    reports that she drinks about 1 2 oz of alcohol per week   reports that she has never smoked  She has never used smokeless tobacco     reports that she does not use drugs  Reproductive:     has no sexual activity history on file  Menstruation history     No LMP recorded  Patient is postmenopausal    Pregnancy history     Cancer Screenings:   Last PAP 1/2018, followed by Dr Zainab Romero   Last mammogram 2/8/18 at [facility]   Colonoscopy 2017 at [address] [place] [address] 2/2016 - normal    The following portions of the patient's history were reviewed and updated as appropriate: allergies, current medications, past family history, past medical history, past social history, past surgical history and problem list     Current Outpatient Prescriptions:     albuterol (PROAIR HFA) 90 mcg/act inhaler, Inhale 2 puffs, Disp: , Rfl:     Probiotic Product (PROBIOTIC-10 PO), Take by mouth, Disp: , Rfl:     Review of Systems   Constitutional: Negative      HENT: Positive for postnasal drip and rhinorrhea  Respiratory: Negative  Cardiovascular: Negative  Gastrointestinal:        Abdominal bloating   Genitourinary: Positive for vaginal bleeding  Negative for vaginal discharge and vaginal pain  Neurological: Positive for headaches  Negative for dizziness  Psychiatric/Behavioral: Positive for sleep disturbance  Negative for dysphoric mood  The patient is not nervous/anxious  Objective:    /70 (BP Location: Left arm, Patient Position: Sitting)   Pulse 64   Temp 98 2 °F (36 8 °C)   Resp 16   Ht 5' (1 524 m)   Wt 59 8 kg (131 lb 12 8 oz)   SpO2 97%   BMI 25 74 kg/m²      Physical Exam   Constitutional: She is oriented to person, place, and time  She appears well-developed and well-nourished  No distress  HENT:   Right Ear: External ear normal    Left Ear: External ear normal    Nose: Nose normal    Mouth/Throat: Oropharynx is clear and moist  No oropharyngeal exudate  Eyes: Conjunctivae and EOM are normal  Pupils are equal, round, and reactive to light  Neck: Neck supple  No thyromegaly present  Cardiovascular: Normal rate, regular rhythm, normal heart sounds and intact distal pulses  Pulmonary/Chest: Effort normal and breath sounds normal  No respiratory distress  She has no wheezes  Abdominal: Soft  Bowel sounds are normal  She exhibits no distension  There is no tenderness  Neurological: She is oriented to person, place, and time  Skin: Skin is dry  Psychiatric: She has a normal mood and affect  Her behavior is normal    Vitals reviewed        PHQ-9 Depression Screening    PHQ-9:    Frequency of the following problems over the past two weeks:       Little interest or pleasure in doing things:  0 - not at all  Feeling down, depressed, or hopeless:  0 - not at all  PHQ-2 Score:  0

## 2018-08-02 ENCOUNTER — OFFICE VISIT (OUTPATIENT)
Dept: URGENT CARE | Facility: MEDICAL CENTER | Age: 63
End: 2018-08-02
Payer: COMMERCIAL

## 2018-08-02 ENCOUNTER — APPOINTMENT (OUTPATIENT)
Dept: LAB | Facility: CLINIC | Age: 63
End: 2018-08-02
Payer: COMMERCIAL

## 2018-08-02 VITALS
DIASTOLIC BLOOD PRESSURE: 86 MMHG | SYSTOLIC BLOOD PRESSURE: 126 MMHG | OXYGEN SATURATION: 97 % | HEIGHT: 60 IN | WEIGHT: 131 LBS | RESPIRATION RATE: 16 BRPM | BODY MASS INDEX: 25.72 KG/M2 | TEMPERATURE: 97.3 F | HEART RATE: 77 BPM

## 2018-08-02 DIAGNOSIS — Z11.59 NEED FOR HEPATITIS C SCREENING TEST: ICD-10-CM

## 2018-08-02 DIAGNOSIS — E78.00 HYPERCHOLESTEROLEMIA: ICD-10-CM

## 2018-08-02 DIAGNOSIS — M79.10 MYALGIA: Primary | ICD-10-CM

## 2018-08-02 DIAGNOSIS — J02.9 ACUTE PHARYNGITIS, UNSPECIFIED ETIOLOGY: Primary | ICD-10-CM

## 2018-08-02 LAB
25(OH)D3 SERPL-MCNC: 28.5 NG/ML (ref 30–100)
ALBUMIN SERPL BCP-MCNC: 3.9 G/DL (ref 3.5–5)
ALP SERPL-CCNC: 78 U/L (ref 46–116)
ALT SERPL W P-5'-P-CCNC: 29 U/L (ref 12–78)
ANION GAP SERPL CALCULATED.3IONS-SCNC: 8 MMOL/L (ref 4–13)
AST SERPL W P-5'-P-CCNC: 25 U/L (ref 5–45)
BASOPHILS # BLD AUTO: 0.02 THOUSANDS/ΜL (ref 0–0.1)
BASOPHILS NFR BLD AUTO: 1 % (ref 0–1)
BILIRUB SERPL-MCNC: 0.26 MG/DL (ref 0.2–1)
BUN SERPL-MCNC: 14 MG/DL (ref 5–25)
CALCIUM SERPL-MCNC: 8.5 MG/DL (ref 8.3–10.1)
CHLORIDE SERPL-SCNC: 105 MMOL/L (ref 100–108)
CHOLEST SERPL-MCNC: 215 MG/DL (ref 50–200)
CO2 SERPL-SCNC: 28 MMOL/L (ref 21–32)
CREAT SERPL-MCNC: 0.77 MG/DL (ref 0.6–1.3)
EOSINOPHIL # BLD AUTO: 0.03 THOUSAND/ΜL (ref 0–0.61)
EOSINOPHIL NFR BLD AUTO: 1 % (ref 0–6)
ERYTHROCYTE [DISTWIDTH] IN BLOOD BY AUTOMATED COUNT: 14.6 % (ref 11.6–15.1)
ERYTHROCYTE [SEDIMENTATION RATE] IN BLOOD: 7 MM/HOUR (ref 0–20)
GFR SERPL CREATININE-BSD FRML MDRD: 83 ML/MIN/1.73SQ M
GLUCOSE P FAST SERPL-MCNC: 81 MG/DL (ref 65–99)
HCT VFR BLD AUTO: 36.8 % (ref 34.8–46.1)
HDLC SERPL-MCNC: 85 MG/DL (ref 40–60)
HGB BLD-MCNC: 13.7 G/DL (ref 11.5–15.4)
IMM GRANULOCYTES # BLD AUTO: 0.01 THOUSAND/UL (ref 0–0.2)
IMM GRANULOCYTES NFR BLD AUTO: 0 % (ref 0–2)
LDLC SERPL CALC-MCNC: 98 MG/DL (ref 0–100)
LYMPHOCYTES # BLD AUTO: 1.63 THOUSANDS/ΜL (ref 0.6–4.47)
LYMPHOCYTES NFR BLD AUTO: 38 % (ref 14–44)
MCH RBC QN AUTO: 39.5 PG (ref 26.8–34.3)
MCHC RBC AUTO-ENTMCNC: 37.2 G/DL (ref 31.4–37.4)
MCV RBC AUTO: 106 FL (ref 82–98)
MONOCYTES # BLD AUTO: 0.59 THOUSAND/ΜL (ref 0.17–1.22)
MONOCYTES NFR BLD AUTO: 14 % (ref 4–12)
NEUTROPHILS # BLD AUTO: 1.98 THOUSANDS/ΜL (ref 1.85–7.62)
NEUTS SEG NFR BLD AUTO: 46 % (ref 43–75)
NONHDLC SERPL-MCNC: 130 MG/DL
NRBC BLD AUTO-RTO: 0 /100 WBCS
PLATELET # BLD AUTO: 188 THOUSANDS/UL (ref 149–390)
PMV BLD AUTO: 10.4 FL (ref 8.9–12.7)
POTASSIUM SERPL-SCNC: 4.2 MMOL/L (ref 3.5–5.3)
PROT SERPL-MCNC: 7.3 G/DL (ref 6.4–8.2)
RBC # BLD AUTO: 3.47 MILLION/UL (ref 3.81–5.12)
SODIUM SERPL-SCNC: 141 MMOL/L (ref 136–145)
TRIGL SERPL-MCNC: 161 MG/DL
TSH SERPL DL<=0.05 MIU/L-ACNC: 2.13 UIU/ML (ref 0.36–3.74)
WBC # BLD AUTO: 4.26 THOUSAND/UL (ref 4.31–10.16)

## 2018-08-02 PROCEDURE — 80053 COMPREHEN METABOLIC PANEL: CPT

## 2018-08-02 PROCEDURE — 85652 RBC SED RATE AUTOMATED: CPT

## 2018-08-02 PROCEDURE — 99214 OFFICE O/P EST MOD 30 MIN: CPT | Performed by: NURSE PRACTITIONER

## 2018-08-02 PROCEDURE — 85025 COMPLETE CBC W/AUTO DIFF WBC: CPT

## 2018-08-02 PROCEDURE — 86803 HEPATITIS C AB TEST: CPT

## 2018-08-02 PROCEDURE — 80061 LIPID PANEL: CPT

## 2018-08-02 PROCEDURE — 36415 COLL VENOUS BLD VENIPUNCTURE: CPT

## 2018-08-02 PROCEDURE — 82306 VITAMIN D 25 HYDROXY: CPT

## 2018-08-02 PROCEDURE — 84443 ASSAY THYROID STIM HORMONE: CPT

## 2018-08-02 RX ORDER — AMOXICILLIN 875 MG/1
875 TABLET, COATED ORAL 2 TIMES DAILY
Qty: 20 TABLET | Refills: 0 | Status: SHIPPED | OUTPATIENT
Start: 2018-08-02 | End: 2018-08-12

## 2018-08-02 NOTE — PROGRESS NOTES
[facility]        NAME: Bala Turner is a 59 y o  female  : 1955    MRN: [id number]  DATE: 2018  TIME: 8:24 AM    Assessment and Plan   Acute pharyngitis, unspecified etiology [J02 9]  1  Acute pharyngitis, unspecified etiology  amoxicillin (AMOXIL) 875 mg tablet    lidocaine viscous (XYLOCAINE) 2 % mucosal solution         Patient Instructions   May alternate Tylenol and Ibuprofen as needed  Encourage fluids and rest    Saline nasal spray as needed  Humidify bedroom  Salt water gargles  Chloraseptic spray and lozenges as needed  Consider adding anti-histamines daily, ie  Claritin or Zyrtec  Complete course of antibiotics as directed  Follow up with PCP in 5-7 days  Proceed to  ER if symptoms worsen  Chief Complaint     Chief Complaint   Patient presents with    Sore Throat     Patient here with complaint of a sore throat for 3 days  Denies fever  Also reports sinus  drainage  History of Present Illness       Patient presents with sore throat and trouble swallowing x 4 days  Has been taking vitamin C  This am was worse, feels like it's closed  Denies fevers, chills, N/V/D  + asthma years ago has not needed inhaler  Nonsmoker  No additional prn meds taken  Reports had been on an airplane this past Friday and Monday and was surrounding by a lot of ill passengers  Review of Systems   Review of Systems   Constitutional: Negative for chills and fever  HENT: Positive for congestion, rhinorrhea, sore throat and trouble swallowing  Negative for ear pain, sinus pain and sinus pressure  Respiratory: Negative  Negative for cough  Cardiovascular: Negative  Gastrointestinal: Negative  Musculoskeletal: Negative for myalgias  Skin: Negative for rash           Current Medications       Current Outpatient Prescriptions:     Probiotic Product (PROBIOTIC-10 PO), Take by mouth, Disp: , Rfl:     albuterol (PROAIR HFA) 90 mcg/act inhaler, Inhale 2 puffs, Disp: , Rfl:     amoxicillin (AMOXIL) 875 mg tablet, Take 1 tablet (875 mg total) by mouth 2 (two) times a day for 10 days, Disp: 20 tablet, Rfl: 0    lidocaine viscous (XYLOCAINE) 2 % mucosal solution, Swish and spit 10 mL 4 (four) times a day as needed for mild pain, Disp: 100 mL, Rfl: 0    Current Allergies     Allergies as of 08/02/2018 - Reviewed 08/02/2018   Allergen Reaction Noted    Ciprofloxacin Nausea Only 02/01/2018    Flagyl [metronidazole] Nausea Only 02/01/2018    Flexeril [cyclobenzaprine]  03/18/2017    Flu virus vaccine  09/25/2014    Morphine  03/18/2017    Onion Hives 01/22/2015            The following portions of the patient's history were reviewed and updated as appropriate: allergies, current medications, past family history, past medical history, past social history, past surgical history and problem list      Past Medical History:   Diagnosis Date    C  difficile diarrhea     resolved 06 jun 2017    Elevated CEA     resolved 06 sep 2017    GERD (gastroesophageal reflux disease)     Reactive airway disease     RESOLVED 06 NOV 2017    Rotator cuff tear     Sleep disorder     RESOLVED 06 NOV 2017       Past Surgical History:   Procedure Laterality Date    BREAST BIOPSY      COLONOSCOPY      DILATION AND CURETTAGE OF UTERUS      ROTATOR CUFF REPAIR      WRIST FRACTURE SURGERY         Family History   Problem Relation Age of Onset    Heart disease Mother         CARDIAC DISORDER    Heart disease Father         CARDIAC DISORDER    Heart failure Father         CONGESTIVE HEART FAILURE    Stroke Father          Medications have been verified  Objective   /86   Pulse 77   Temp (!) 97 3 °F (36 3 °C) (Axillary)   Resp 16   Ht 5' (1 524 m)   Wt 59 4 kg (131 lb)   SpO2 97%   BMI 25 58 kg/m²        Physical Exam     Physical Exam   Constitutional: She is oriented to person, place, and time  Vital signs are normal  She appears well-developed and well-nourished  She is cooperative  Non-toxic appearance  She does not have a sickly appearance  She appears ill  No distress  HENT:   Head: Normocephalic and atraumatic  Right Ear: Hearing, tympanic membrane, external ear and ear canal normal    Left Ear: Hearing, tympanic membrane, external ear and ear canal normal    Nose: Nose normal  No mucosal edema, rhinorrhea or sinus tenderness  Right sinus exhibits no maxillary sinus tenderness and no frontal sinus tenderness  Left sinus exhibits no maxillary sinus tenderness and no frontal sinus tenderness  Mouth/Throat: Uvula is midline and mucous membranes are normal  Normal dentition  Posterior oropharyngeal edema and posterior oropharyngeal erythema present  No oropharyngeal exudate  Eyes: Conjunctivae, EOM and lids are normal  Pupils are equal, round, and reactive to light  Right eye exhibits no discharge  Left eye exhibits no discharge  Neck: Trachea normal and normal range of motion  No thyroid mass and no thyromegaly present  Cardiovascular: Normal rate, regular rhythm, S1 normal, S2 normal, normal heart sounds, intact distal pulses and normal pulses  Exam reveals no gallop and no friction rub  No murmur heard  Pulmonary/Chest: Effort normal and breath sounds normal  No respiratory distress  She has no wheezes  She has no rhonchi  She has no rales  She exhibits no tenderness  Musculoskeletal: Normal range of motion  She exhibits no edema  Lymphadenopathy:     She has cervical adenopathy  Neurological: She is alert and oriented to person, place, and time  Skin: Skin is warm and dry  No rash noted  She is not diaphoretic  Psychiatric: She has a normal mood and affect  Her behavior is normal  Thought content normal    Nursing note and vitals reviewed

## 2018-08-02 NOTE — PATIENT INSTRUCTIONS
May alternate Tylenol and Ibuprofen as needed  Encourage fluids and rest    Saline nasal spray as needed  Humidify bedroom  Salt water gargles  Chloraseptic spray and lozenges as needed  Consider adding anti-histamines daily, ie  Claritin or Zyrtec  Complete course of antibiotics as directed  F/U with PCP if symptoms persist/worsen or go to nearest emergency department if any signs of distress  Pharyngitis   WHAT YOU NEED TO KNOW:   Pharyngitis, or sore throat, is inflammation of the tissues and structures in your pharynx (throat)  Pharyngitis is most often caused by bacteria  It may also be caused by a cold or flu virus  Other causes include smoking, allergies, or acid reflux  DISCHARGE INSTRUCTIONS:   Call 911 for any of the following:   · You have trouble breathing or swallowing because your throat is swollen or sore  Return to the emergency department if:   · You are drooling because it hurts too much to swallow  · Your fever is higher than 102? F (39?C) or lasts longer than 3 days  · You are confused  · You taste blood in your throat  Contact your healthcare provider if:   · Your throat pain gets worse  · You have a painful lump in your throat that does not go away after 5 days  · Your symptoms do not improve after 5 days  · You have questions or concerns about your condition or care  Medicines:  Viral pharyngitis will go away on its own without treatment  Your sore throat should start to feel better in 3 to 5 days for both viral and bacterial infections  You may need any of the following:  · Antibiotics  treat a bacterial infection  · NSAIDs , such as ibuprofen, help decrease swelling, pain, and fever  NSAIDs can cause stomach bleeding or kidney problems in certain people  If you take blood thinner medicine, always ask your healthcare provider if NSAIDs are safe for you  Always read the medicine label and follow directions      · Acetaminophen  decreases pain and fever  It is available without a doctor's order  Ask how much to take and how often to take it  Follow directions  Acetaminophen can cause liver damage if not taken correctly  · Take your medicine as directed  Contact your healthcare provider if you think your medicine is not helping or if you have side effects  Tell him or her if you are allergic to any medicine  Keep a list of the medicines, vitamins, and herbs you take  Include the amounts, and when and why you take them  Bring the list or the pill bottles to follow-up visits  Carry your medicine list with you in case of an emergency  Manage your symptoms:   · Gargle salt water  Mix ¼ teaspoon salt in an 8 ounce glass of warm water and gargle  This may help decrease swelling in your throat  · Drink liquids as directed  You may need to drink more liquids than usual  Liquids may help soothe your throat and prevent dehydration  Ask how much liquid to drink each day and which liquids are best for you  · Use a cool-steam humidifier  to help moisten the air in your room and calm your cough  · Soothe your throat  with cough drops, ice, soft foods, or popsicles  Prevent the spread of pharyngitis:  Cover your mouth and nose when you cough or sneeze  Do not share food or drinks  Wash your hands often  Use soap and water  If soap and water are unavailable, use an alcohol based hand   Follow up with your healthcare provider as directed:  Write down your questions so you remember to ask them during your visits  © 2017 [address] [employer] Information is for End User's use only and may not be sold, redistributed or otherwise used for commercial purposes  All illustrations and images included in CareNotes® are the copyrighted property of [employer]  or Orlando Sanabria  The above information is an  only  It is not intended as medical advice for individual conditions or treatments   Talk to your doctor, nurse or pharmacist before following any medical regimen to see if it is safe and effective for you

## 2018-08-02 NOTE — LETTER
August 6, 2018     Patient: Toby Castillo   YOB: 1955   Date of Visit: 8/2/2018       To Whom it May Concern: Arleth Redmond was seen in my clinic on 8/2/2018  She may return to work on Monday, August 6, 2018  Please excuse from work on Friday, August 3, 2018       If you have any questions or concerns, please don't hesitate to call           Sincerely,          MARLEY Foster        CC: No Recipients

## 2018-08-03 LAB — HCV AB SER QL: NORMAL

## 2018-08-06 ENCOUNTER — TRANSCRIBE ORDERS (OUTPATIENT)
Dept: PHYSICAL THERAPY | Facility: MEDICAL CENTER | Age: 63
End: 2018-08-06

## 2018-08-06 ENCOUNTER — EVALUATION (OUTPATIENT)
Dept: PHYSICAL THERAPY | Facility: MEDICAL CENTER | Age: 63
End: 2018-08-06
Payer: COMMERCIAL

## 2018-08-06 DIAGNOSIS — M25.562 LEFT KNEE PAIN, UNSPECIFIED CHRONICITY: Primary | ICD-10-CM

## 2018-08-06 DIAGNOSIS — M25.561 RIGHT KNEE PAIN, UNSPECIFIED CHRONICITY: ICD-10-CM

## 2018-08-06 PROCEDURE — 97161 PT EVAL LOW COMPLEX 20 MIN: CPT | Performed by: PHYSICAL THERAPIST

## 2018-08-06 NOTE — PROGRESS NOTES
PT Evaluation     Today's date: 2018  Patient name: Bala Turner  : 1955  MRN: [id number]  Referring provider: Pillo Rutherford MD  Dx:   Encounter Diagnosis     ICD-10-CM    1  Left knee pain, unspecified chronicity M25 562    2  Right knee pain, unspecified chronicity M25 561          Assessment    Assessment details: Pt is a pleasant 59year old female presenting to physical therapy with B knee pain  Pt would benefit from skilled PT to address current impairments and maximize pts function  Understanding of Dx/Px/POC: good   Prognosis: good    Goals  Impairment Goals  - Pt I with initial HEP in 1-2 visits  - Maximize quad flexibility in 4-6 weeks  - Increase strength to 5/5 in all affected areas in 4-6 week    Functional Goals  - Increase FOTO to at least 61 in 6-8 weeks  - Patient will be independent with comprehensive HEP in 6-8 weeks  - Ambulation is improved to prior level of function in 6-8 weeks  - Stair climbing is improved to prior level of function in 6-8 weeks  - Squatting is improved to prior level of function in 6-8 weeks      Plan  Patient would benefit from: skilled physical therapy  Other planned modality interventions: Modalities prn  Planned therapy interventions: neuromuscular re-education, patient education, strengthening, stretching, therapeutic exercise, home exercise program and balance  Frequency: 2x week  Duration in weeks: 8  Treatment plan discussed with: patient        Subjective Evaluation    History of Present Illness  Mechanism of injury: Pt reports that over a year ago she injured both knees  One time was when a dog pulled her down and the other when she fell up the stairs chasing a dog  She has had progressively increasing knee pain B  Then in July she was on vacation her knees were swollen and hurt after she rode the bike at the beach        Knee pain is aggravated by prolonged WB activity, repetitive activity, getting in and out of a chair at the end of the day, stairs and squatting  Pain  Current pain ratin  At best pain ratin  At worst pain rating: 10  Quality: sharp    Social Support    Employment status: not working  Exercise history: working out with a -not currently doing this      Diagnostic Tests  X-ray: abnormal (B arthritis)  Treatments  No previous or current treatments  Patient Goals  Patient goals for therapy: decreased pain, increased strength and return to sport/leisure activities          Objective     Lumbar Screen  Lumbar range of motion within normal limits with the following exceptions:Segmental trunk flexion and rotation are functional    Segmental trunk extension is dysfunctional    Functional squat:  Pt with fair squat depth and no wt shift, but she did demonstrate excessive hip flexion and trunk flexion during the movement    Balance:  Decreased B with EO    Neurological Testing     Sensation     Hip   Left Hip   Intact: light touch    Right Hip   Intact: light touch    Active Range of Motion   Left Knee   Normal active range of motion    Right Knee   Normal active range of motion    Mobility   Patellar Mobility:   Left Knee   WFL: medial, lateral, superior and inferior       Right Knee   WFL: medial, lateral, superior and inferior    Strength/Myotome Testing     Left Hip   Planes of Motion   Flexion: 4  Extension: 3+  Abduction: 3+  Adduction: 4    Isolated Muscles   Gluteus stacey: 3+    Right Hip   Planes of Motion   Flexion: 4  Extension: 3+  Abduction: 3+  Adduction: 4    Isolated Muscles   Gluteus maximums: 3+    Left Knee   Prone flexion: 4  Extension: 5    Right Knee   Prone flexion: 4  Extension: 5    General Comments     Knee Comments  - HS tightness B  - Piriformis tightness B  + Quad tightness B          Precautions: None    Daily Treatment Diary       Exercise Diary              Bike NV            Quad str IP            QS IP            SLR flexion IP            Glute sets IP            Bridging IP Standing hip abd IP            Clam and rev clam shells NV            Prone hip ext NV            Glute raises NV

## 2018-08-06 NOTE — LETTER
2018    Robin Sanon MD  [facility] [address] [place] [address]    Patient: Josh Pugh   YOB: 1955   Date of Visit: 2018     Encounter Diagnosis     ICD-10-CM    1  Left knee pain, unspecified chronicity M25 562    2  Right knee pain, unspecified chronicity M25 561        Dear Dr Joseph Gonzalez:    Please review the attached Plan of Care from [facility] recent visit  Please verify that you agree therapy should continue by signing the attached document and sending it back to our office  If you have any questions or concerns, please don't hesitate to call  Sincerely,    Parveen Laura, PT      Referring Provider:      I certify that I have read the below Plan of Care and certify the need for these services furnished under this plan of treatment while under my care  Robin Sanon MD  [facility]: [phone]          PT Evaluation     Today's date: 2018  Patient name: Josh Pugh  : 1955  MRN: [id number]  Referring provider: Angelia Auguste MD  Dx:   Encounter Diagnosis     ICD-10-CM    1  Left knee pain, unspecified chronicity M25 562    2  Right knee pain, unspecified chronicity M25 561          Assessment    Assessment details: Pt is a pleasant 59year old female presenting to physical therapy with B knee pain  Pt would benefit from skilled PT to address current impairments and maximize pts function    Understanding of Dx/Px/POC: good   Prognosis: good    Goals  Impairment Goals  - Pt I with initial HEP in 1-2 visits  - Maximize quad flexibility in 4-6 weeks  - Increase strength to 5/5 in all affected areas in 4-6 week    Functional Goals  - Increase FOTO to at least 61 in 6-8 weeks  - Patient will be independent with comprehensive HEP in 6-8 weeks  - Ambulation is improved to prior level of function in 6-8 weeks  - Stair climbing is improved to prior level of function in 6-8 weeks  - Squatting is improved to prior level of function in 6-8 weeks      Plan  Patient would benefit from: skilled physical therapy  Other planned modality interventions: Modalities prn  Planned therapy interventions: neuromuscular re-education, patient education, strengthening, stretching, therapeutic exercise, home exercise program and balance  Frequency: 2x week  Duration in weeks: 8  Treatment plan discussed with: patient        Subjective Evaluation    History of Present Illness  Mechanism of injury: Pt reports that over a year ago she injured both knees  One time was when a dog pulled her down and the other when she fell up the stairs chasing a dog  She has had progressively increasing knee pain B  Then in July she was on vacation her knees were swollen and hurt after she rode the bike at the beach  Knee pain is aggravated by prolonged WB activity, repetitive activity, getting in and out of a chair at the end of the day, stairs and squatting    Pain  Current pain ratin  At best pain ratin  At worst pain rating: 10  Quality: sharp    Social Support    Employment status: not working  Exercise history: working out with a -not currently doing this      Diagnostic Tests  X-ray: abnormal (B arthritis)  Treatments  No previous or current treatments  Patient Goals  Patient goals for therapy: decreased pain, increased strength and return to sport/leisure activities          Objective     Lumbar Screen  Lumbar range of motion within normal limits with the following exceptions:Segmental trunk flexion and rotation are functional    Segmental trunk extension is dysfunctional    Functional squat:  Pt with fair squat depth and no wt shift, but she did demonstrate excessive hip flexion and trunk flexion during the movement    Balance:  Decreased B with EO    Neurological Testing     Sensation     Hip   Left Hip   Intact: light touch    Right Hip   Intact: light touch    Active Range of Motion   Left Knee   Normal active range of motion    Right Knee   Normal active range of motion    Mobility   Patellar Mobility:   Left Knee   WFL: medial, lateral, superior and inferior       Right Knee   WFL: medial, lateral, superior and inferior    Strength/Myotome Testing     Left Hip   Planes of Motion   Flexion: 4  Extension: 3+  Abduction: 3+  Adduction: 4    Isolated Muscles   Gluteus stacey: 3+    Right Hip   Planes of Motion   Flexion: 4  Extension: 3+  Abduction: 3+  Adduction: 4    Isolated Muscles   Gluteus maximums: 3+    Left Knee   Prone flexion: 4  Extension: 5    Right Knee   Prone flexion: 4  Extension: 5    General Comments     Knee Comments  - HS tightness B  - Piriformis tightness B  + Quad tightness B          Precautions: None    Daily Treatment Diary       Exercise Diary  8/6            Bike NV            Quad str IP            QS IP            SLR flexion IP            Glute sets IP            Bridging IP            Standing hip abd IP            Clam and rev clam shells NV            Prone hip ext NV            Glute raises NV

## 2018-08-07 ENCOUNTER — TELEPHONE (OUTPATIENT)
Dept: INTERNAL MEDICINE CLINIC | Facility: CLINIC | Age: 63
End: 2018-08-07

## 2018-08-07 NOTE — TELEPHONE ENCOUNTER
Spoke with patient via phone 8/7/18 at [phone]  Had labs drawn [facility]/2/[facility] Anyi Kaplan  Was seen due to b/l knee pain, now receiving physical therapy  Patient was instructed to call here to discuss the labs  Vitamin D was checked, in insufficient range and patient advised to start maintenance vitamin D3 at least 2000units daily  CBCD was also checked that showed mild leukopenia, macrocytosis without anemia and elevated monocytes  Patient reports had sore throat the day of labs and also seen by Urgent Care and started on PCN  She will have yearly follow up by [facility] on Fri 8/10/18  Labs are anticipated  Advised to send cc of CBCD if redrawn  If not, will send her cbcd to repeat     ----- Message from Jackelin Castillo sent at 8/6/2018  7:47 AM EDT -----  Regarding: Test Results Question  Contact: [phone]  PLEASE REVIEW & ADVISE  THANKS!    ----- Message -----  From: Bella Escalera  Sent: 8/4/2018   1:19 PM  To: [facility]  Subject: Test Results Question                            Hello,    I recently had two sets of blood work completed  Please have someone call me to explain the results, and if there are any problems or concerns  Dr Anyi Kaplan ([facility]) recommended that I start taking Vitamin D--went to her with knee mobility issues  Thank you

## 2018-08-13 ENCOUNTER — OFFICE VISIT (OUTPATIENT)
Dept: PHYSICAL THERAPY | Facility: MEDICAL CENTER | Age: 63
End: 2018-08-13
Payer: COMMERCIAL

## 2018-08-13 DIAGNOSIS — M25.561 RIGHT KNEE PAIN, UNSPECIFIED CHRONICITY: ICD-10-CM

## 2018-08-13 DIAGNOSIS — M25.562 LEFT KNEE PAIN, UNSPECIFIED CHRONICITY: Primary | ICD-10-CM

## 2018-08-13 PROCEDURE — 97110 THERAPEUTIC EXERCISES: CPT

## 2018-08-13 PROCEDURE — 97112 NEUROMUSCULAR REEDUCATION: CPT

## 2018-08-13 NOTE — PROGRESS NOTES
Daily Note     Today's date: 2018  Patient name: Josh Pugh  : 1955  MRN: [id number]  Referring provider: Angelia Auguste MD  Dx:   Encounter Diagnosis     ICD-10-CM    1  Left knee pain, unspecified chronicity M25 562    2  Right knee pain, unspecified chronicity M25 561                   Subjective: Pt reports no changes since IE  Objective: See treatment diary below  Precautions: None    Daily Treatment Diary       Exercise Diary             Bike NV 10 min           Quad str IP 30"x3           QS IP 5"  3x10           SLR flexion IP 3x10           Glute sets IP 5"  3x10           Bridging IP 3x10           Standing hip abd IP 3x10           Clam and rev clam shells NV 3x10           Prone hip ext NV 3x10           Glute raises NV 3x10                                                                                                                                               Assessment: Tolerated treatment well  Patient demonstrated fatigue post PT session  Pt used good technique after initial instruction w/ex's  Plan: Continue per plan of care

## 2018-08-20 ENCOUNTER — OFFICE VISIT (OUTPATIENT)
Dept: PHYSICAL THERAPY | Facility: MEDICAL CENTER | Age: 63
End: 2018-08-20
Payer: COMMERCIAL

## 2018-08-20 DIAGNOSIS — M25.562 LEFT KNEE PAIN, UNSPECIFIED CHRONICITY: Primary | ICD-10-CM

## 2018-08-20 DIAGNOSIS — M25.561 RIGHT KNEE PAIN, UNSPECIFIED CHRONICITY: ICD-10-CM

## 2018-08-20 PROCEDURE — 97112 NEUROMUSCULAR REEDUCATION: CPT

## 2018-08-20 PROCEDURE — 97110 THERAPEUTIC EXERCISES: CPT

## 2018-08-20 NOTE — PROGRESS NOTES
Daily Note     Today's date: 2018  Patient name: Gillian Schulte  : 1955  MRN: [id number]  Referring provider: Wes Ricardo MD  Dx:   Encounter Diagnosis     ICD-10-CM    1  Left knee pain, unspecified chronicity M25 562    2  Right knee pain, unspecified chronicity M25 561                   Subjective: Pt reports that her knees are feeling better and that she has less pain with performing her regular activities  Objective: See treatment diary below  Precautions: None    Daily Treatment Diary       Exercise Diary            Bike NV 10 min 10 min          Quad str IP 30"x3 30"x3          QS IP 5"  3x10 5"  3x10          SLR flexion IP 3x10 3x10          Glute sets IP 5"  3x10 5"  3x10          Bridging IP 3x10 3x12          Standing hip abd IP 3x10 1#  3x10          Clam and rev clam shells NV 3x10 L2  3x10          Prone hip ext NV 3x10 1#  3x10          Glute raises NV 3x10 1#  3x10                                                                                                                                                Assessment: Tolerated treatment well  Patient demonstrated fatigue post treatment, exhibited good technique with therapeutic exercises and would benefit from continued PT  Progress pt at NV if able         Plan: Continue per plan of care

## 2018-08-27 ENCOUNTER — OFFICE VISIT (OUTPATIENT)
Dept: PHYSICAL THERAPY | Facility: MEDICAL CENTER | Age: 63
End: 2018-08-27
Payer: COMMERCIAL

## 2018-08-27 DIAGNOSIS — M25.561 RIGHT KNEE PAIN, UNSPECIFIED CHRONICITY: ICD-10-CM

## 2018-08-27 DIAGNOSIS — M25.562 LEFT KNEE PAIN, UNSPECIFIED CHRONICITY: Primary | ICD-10-CM

## 2018-08-27 PROCEDURE — 97112 NEUROMUSCULAR REEDUCATION: CPT

## 2018-08-27 PROCEDURE — 97110 THERAPEUTIC EXERCISES: CPT

## 2018-08-27 NOTE — PROGRESS NOTES
Daily Note     Today's date: 2018  Patient name: Nel Perez  : 1955  MRN: [id number]  Referring provider: Won Jordan MD  Dx:   Encounter Diagnosis     ICD-10-CM    1  Left knee pain, unspecified chronicity M25 562    2  Right knee pain, unspecified chronicity M25 561          Subjective: Pt reports that her knees are feeling better  Objective: See treatment diary below      Assessment: Tolerated treatment well  Patient demonstrated fatigue post treatment, exhibited good technique with therapeutic exercises and would benefit from continued PT  Pt progressing well and demonstrates improved strength  Plan: Continue per plan of care         Precautions: None    Daily Treatment Diary       Exercise Diary           Bike NV 10 min 10 min 10'         Quad str IP 30"x3 30"x3 3X30"         QS IP 5"  3x10 5"  3x10 5"  3x10         SLR flexion IP 3x10 3x10 1#  3x10         Glute sets IP 5"  3x10 5"  3x10 5"  3x10         Bridging IP 3x10 3x12 With  March  3x10         Standing hip abd IP 3x10 1#  3x10 1#  3x12         Clam and rev clam shells NV 3x10 L2  3x10 L2  3x12         Prone hip ext NV 3x10 1#  3x10 1#  3x12         Glute raises NV 3x10 1#  3x10 1#  3x12         SLS    30"x3

## 2018-09-13 ENCOUNTER — OFFICE VISIT (OUTPATIENT)
Dept: URGENT CARE | Facility: MEDICAL CENTER | Age: 63
End: 2018-09-13
Payer: COMMERCIAL

## 2018-09-13 VITALS
WEIGHT: 127 LBS | HEART RATE: 81 BPM | SYSTOLIC BLOOD PRESSURE: 130 MMHG | HEIGHT: 60 IN | DIASTOLIC BLOOD PRESSURE: 74 MMHG | RESPIRATION RATE: 16 BRPM | OXYGEN SATURATION: 98 % | TEMPERATURE: 98.7 F | BODY MASS INDEX: 24.94 KG/M2

## 2018-09-13 DIAGNOSIS — B02.9 HERPES ZOSTER WITHOUT COMPLICATION: Primary | ICD-10-CM

## 2018-09-13 PROCEDURE — G0383 LEV 4 HOSP TYPE B ED VISIT: HCPCS | Performed by: NURSE PRACTITIONER

## 2018-09-13 PROCEDURE — S9083 URGENT CARE CENTER GLOBAL: HCPCS | Performed by: NURSE PRACTITIONER

## 2018-09-13 RX ORDER — VALACYCLOVIR HYDROCHLORIDE 1 G/1
1000 TABLET, FILM COATED ORAL 3 TIMES DAILY
Qty: 21 TABLET | Refills: 0 | Status: SHIPPED | OUTPATIENT
Start: 2018-09-13 | End: 2019-02-15

## 2018-09-13 NOTE — PATIENT INSTRUCTIONS
Maintain good hygiene of site, wash and dry well  Utilize hypo-allergenic soap  ie Dove or Neutrogena  Complete course of antivirals as directed  May utilize tylenol or ibuprofen for discomfort  Follow up with PCP if symptoms persist or worsen, or go to nearest ED if any signs of sepsis, ie  fevers, chills, vomiting, change of mental status  Shingles   AMBULATORY CARE:   Shingles  is a painful rash  Shingles is caused by the same virus that causes chickenpox (varicella-zoster virus)  After you get chickenpox, the virus stays in your body for several years without causing any symptoms  Shingles occurs when the virus becomes active again  Once active, the virus will travel along a nerve to your skin and cause a rash  Common signs and symptoms include the following:  Shingles often starts with pain in the back, chest, neck, or face  A rash then develops in the same area  The rash is usually found on only one side of the body  The rash may feel itchy or painful  It starts as red dots that become blisters filled with fluid  The blisters usually grow bigger, become filled with pus, and then crust over after a few days  You may also have any of the following:  · Fatigue and muscle weakness    · Pain when your skin is lightly touched    · Headache    · Fever    · Eye pain when exposed to light  Seek care immediately if:   · You have painful, red, warm skin around the blisters, or the blisters drain pus  · Your neck is stiff or you have trouble moving it  · You have trouble moving your arms, legs, or face  · You have a seizure  · You have weakness in an arm or leg  · You become confused, or have difficulty speaking  · You have dizziness, a severe headache, or hearing or vision loss  Contact your healthcare provider if:   · You feel weak or have a headache  · You have a cough, chills, or a fever  · You have abdominal pain or nausea, or you are vomiting      · Your rash becomes more itchy or painful  · Your rash spreads to other parts of your body  · Your pain worsens and does not go away even after you take medicine  · You have questions or concerns about your condition or care  Medicines:   · Antiviral medicine  helps decrease symptoms and healing time  They may also decrease your risk of developing nerve pain  You will need to start taking them within 3 days of the start of symptoms to prevent nerve pain  · Pain medicine  may be prescribed or suggested by your healthcare provider  You may need NSAIDs, acetaminophen, or opioid medicine depending on how much pain you are in  Do not wait until the pain is severe before you take more pain medicine  · Topical anesthetics  are used to numb the skin and decrease pain  They can be a cream, gel, spray, or patch  · Anticonvulsants  decrease nerve pain and may help you sleep at night  · Antidepressants  may be used to decrease nerve pain  Follow up with your healthcare provider as directed:  Write down your questions so you remember to ask them during your visits  Self-care:  Keep your rash clean and dry  Cover your rash with a bandage or clothing  Do not use bandages that stick to your skin  The sticky part may irritate your skin and make your rash last longer  Prevent the spread of shingles: The virus can be passed to a person who has never had chickenpox  This person may get chickenpox, but not shingles  You may pass the virus to others as long as you have a rash  The virus is spread by direct contact with the fluid from the blisters  Usually, you cannot spread the virus once the blisters dry up  Prevent shingles or another shingles outbreak:  A vaccine may be given to help prevent shingles  Ask for more information about this vaccine  © 2017 [employer] Information is for End User's use only and may not be sold, redistributed or otherwise used for commercial purposes   All illustrations and images included in [address] are the copyrighted property of [employer]  or [employer]  The above information is an  only  It is not intended as medical advice for individual conditions or treatments   Talk to your doctor, nurse or pharmacist before following any medical regimen to see if it is safe and effective fo

## 2018-09-18 ENCOUNTER — OFFICE VISIT (OUTPATIENT)
Dept: INTERNAL MEDICINE CLINIC | Facility: CLINIC | Age: 63
End: 2018-09-18
Payer: COMMERCIAL

## 2018-09-18 VITALS
OXYGEN SATURATION: 98 % | SYSTOLIC BLOOD PRESSURE: 120 MMHG | BODY MASS INDEX: 25.32 KG/M2 | HEART RATE: 60 BPM | TEMPERATURE: 97.6 F | WEIGHT: 129 LBS | DIASTOLIC BLOOD PRESSURE: 76 MMHG | HEIGHT: 60 IN

## 2018-09-18 DIAGNOSIS — B02.23 ACUTE HERPES ZOSTER NEUROPATHY: Primary | ICD-10-CM

## 2018-09-18 DIAGNOSIS — B02.29 POST HERPETIC NEURALGIA: ICD-10-CM

## 2018-09-18 PROCEDURE — 1036F TOBACCO NON-USER: CPT | Performed by: NURSE PRACTITIONER

## 2018-09-18 PROCEDURE — 99213 OFFICE O/P EST LOW 20 MIN: CPT | Performed by: NURSE PRACTITIONER

## 2018-09-18 PROCEDURE — 3008F BODY MASS INDEX DOCD: CPT | Performed by: NURSE PRACTITIONER

## 2018-09-18 RX ORDER — GABAPENTIN 300 MG/1
300 CAPSULE ORAL 3 TIMES DAILY
Qty: 90 CAPSULE | Refills: 0 | Status: SHIPPED | OUTPATIENT
Start: 2018-09-18 | End: 2019-02-15

## 2018-09-18 NOTE — ASSESSMENT & PLAN NOTE
Pt is prescribed gabapentin at a titrating dose  Pt to begin with 300 mg daily and add on an additional cap every 3 days to make dose of 300 mg TID  Reviewed skin care and advised bathing with mild soap such as dove and using cool - lukewarm water to minimize pain and irritation  Leave skin JASPER as much as able  Pt is going to be going to [place] next week for a wedding  I recommended that she call the office if any additional problems develop prior to leaving and otherwise she can follow up after she returns

## 2018-09-18 NOTE — PROGRESS NOTES
Assessment/Plan:    Post herpetic neuralgia  Pt is prescribed gabapentin at a titrating dose  Pt to begin with 300 mg daily and add on an additional cap every 3 days to make dose of 300 mg TID  Reviewed skin care and advised bathing with mild soap such as dove and using cool - lukewarm water to minimize pain and irritation  Leave skin JASPER as much as able  Pt is going to be going to [place] next week for a wedding  I recommended that she call the office if any additional problems develop prior to leaving and otherwise she can follow up after she returns  Diagnoses and all orders for this visit:    Acute herpes zoster neuropathy  -     gabapentin (NEURONTIN) 300 mg capsule; Take 1 capsule (300 mg total) by mouth 3 (three) times a day    Post herpetic neuralgia          Subjective:      Patient ID: Monik Medrano is a 59 y o  female  Pt is a 59 y o  y/o female who is seen today for evaluation of pain and itching 2/2 to herpes zoster  She states she noted a single lesion on 9/2 and then on 9/9 she noticed that there were additional spots and by 9/13 it had spread so severely that she decided to get treatment  She was seen 9/13 at urgent care and started on valacyclovir 1000 mg TID x 7 days  She was advised to take ibuprofen for pain but this upsets her stomach so she has not been taking anything  She is complaining of pain and discomfort that has prevented her from sleeping well  She does report nausea today but otherwise has not had a problem with this  She denies fever chills  Nausea   Associated symptoms include fatigue, headaches, nausea and a rash  Pertinent negatives include no chest pain, chills, congestion or fever  Rash   Associated symptoms include fatigue  Pertinent negatives include no congestion, fever or shortness of breath         The following portions of the patient's history were reviewed and updated as appropriate: allergies, current medications, past family history, past medical history, past social history, past surgical history and problem list     Review of Systems   Constitutional: Positive for fatigue  Negative for chills and fever  HENT: Negative for congestion  Respiratory: Negative for shortness of breath  Cardiovascular: Negative for chest pain and palpitations  Gastrointestinal: Positive for nausea  Skin: Positive for rash  Neurological: Positive for headaches  Negative for dizziness  Psychiatric/Behavioral: Positive for sleep disturbance  The patient is nervous/anxious  Objective:      /76 (BP Location: Left arm, Patient Position: Sitting, Cuff Size: Adult)   Pulse 60   Temp 97 6 °F (36 4 °C) (Tympanic)   Ht 5' (1 524 m)   Wt 58 5 kg (129 lb)   SpO2 98%   BMI 25 19 kg/m²          Physical Exam   Constitutional: She is oriented to person, place, and time  Vital signs are normal  She appears well-developed and well-nourished  She is cooperative  Neck: No JVD present  Carotid bruit is not present  Cardiovascular: Normal rate, regular rhythm, normal heart sounds and normal pulses  Pulmonary/Chest: Effort normal and breath sounds normal    Abdominal: Soft  Normal appearance and bowel sounds are normal  There is no tenderness  Neurological: She is alert and oriented to person, place, and time  Skin: Skin is warm, dry and intact  Rash noted  Rash is papular and vesicular  The rash consists of clustered salmon colored papules and vesicles on an erythematic base in a linear distribution on the R hip to groin dermatome  There is not drainage noted, no crusting of lesions  Psychiatric: She has a normal mood and affect  Her speech is normal and behavior is normal  Judgment and thought content normal  Cognition and memory are normal    Vitals reviewed

## 2019-01-02 ENCOUNTER — TRANSCRIBE ORDERS (OUTPATIENT)
Dept: ADMINISTRATIVE | Facility: HOSPITAL | Age: 64
End: 2019-01-02

## 2019-01-02 ENCOUNTER — APPOINTMENT (OUTPATIENT)
Dept: RADIOLOGY | Facility: MEDICAL CENTER | Age: 64
End: 2019-01-02
Payer: COMMERCIAL

## 2019-01-02 DIAGNOSIS — M54.2 NECK PAIN: ICD-10-CM

## 2019-01-02 DIAGNOSIS — M54.5 LOW BACK PAIN, UNSPECIFIED BACK PAIN LATERALITY, UNSPECIFIED CHRONICITY, WITH SCIATICA PRESENCE UNSPECIFIED: ICD-10-CM

## 2019-01-02 DIAGNOSIS — M54.2 NECK PAIN: Primary | ICD-10-CM

## 2019-01-02 PROCEDURE — 72040 X-RAY EXAM NECK SPINE 2-3 VW: CPT

## 2019-01-02 PROCEDURE — 72100 X-RAY EXAM L-S SPINE 2/3 VWS: CPT

## 2019-02-15 ENCOUNTER — APPOINTMENT (OUTPATIENT)
Dept: RADIOLOGY | Facility: MEDICAL CENTER | Age: 64
End: 2019-02-15
Payer: COMMERCIAL

## 2019-02-15 ENCOUNTER — OFFICE VISIT (OUTPATIENT)
Dept: URGENT CARE | Facility: MEDICAL CENTER | Age: 64
End: 2019-02-15
Payer: COMMERCIAL

## 2019-02-15 ENCOUNTER — TELEPHONE (OUTPATIENT)
Dept: PHYSICAL THERAPY | Facility: OTHER | Age: 64
End: 2019-02-15

## 2019-02-15 VITALS
HEIGHT: 60 IN | OXYGEN SATURATION: 98 % | DIASTOLIC BLOOD PRESSURE: 80 MMHG | HEART RATE: 72 BPM | TEMPERATURE: 97.5 F | RESPIRATION RATE: 16 BRPM | WEIGHT: 133 LBS | SYSTOLIC BLOOD PRESSURE: 130 MMHG | BODY MASS INDEX: 26.11 KG/M2

## 2019-02-15 DIAGNOSIS — S39.92XA LOWER BACK INJURY, INITIAL ENCOUNTER: ICD-10-CM

## 2019-02-15 DIAGNOSIS — S16.1XXA CERVICAL STRAIN, ACUTE, INITIAL ENCOUNTER: Primary | ICD-10-CM

## 2019-02-15 DIAGNOSIS — S19.9XXA INJURY OF NECK, INITIAL ENCOUNTER: ICD-10-CM

## 2019-02-15 DIAGNOSIS — S39.012A LUMBAR STRAIN, INITIAL ENCOUNTER: ICD-10-CM

## 2019-02-15 DIAGNOSIS — S09.90XA INJURY OF HEAD, INITIAL ENCOUNTER: ICD-10-CM

## 2019-02-15 PROCEDURE — G0381 LEV 2 HOSP TYPE B ED VISIT: HCPCS | Performed by: PHYSICIAN ASSISTANT

## 2019-02-15 PROCEDURE — 72040 X-RAY EXAM NECK SPINE 2-3 VW: CPT

## 2019-02-15 PROCEDURE — 72100 X-RAY EXAM L-S SPINE 2/3 VWS: CPT

## 2019-02-15 PROCEDURE — S9083 URGENT CARE CENTER GLOBAL: HCPCS | Performed by: PHYSICIAN ASSISTANT

## 2019-02-15 RX ORDER — AMITRIPTYLINE HYDROCHLORIDE 50 MG/1
TABLET, FILM COATED ORAL DAILY
COMMUNITY
End: 2019-02-15

## 2019-02-15 RX ORDER — METHOCARBAMOL 750 MG/1
750 TABLET, FILM COATED ORAL EVERY 6 HOURS PRN
Qty: 15 TABLET | Refills: 0 | Status: SHIPPED | OUTPATIENT
Start: 2019-02-15 | End: 2019-04-10 | Stop reason: ALTCHOICE

## 2019-02-15 RX ORDER — PREDNISONE 50 MG/1
50 TABLET ORAL DAILY
Qty: 5 TABLET | Refills: 0 | Status: SHIPPED | OUTPATIENT
Start: 2019-02-15 | End: 2019-02-20

## 2019-02-15 NOTE — PATIENT INSTRUCTIONS
Take prednisone as directed until completed  Motrin and/or Tylenol as needed for pain control  Robaxin as needed for additional pain control  Call [facility]ette to schedule an appointment  [phone]  Follow-up with ambulatory [facility]  Follow up with PCP in 3-5 days  Proceed to  ER if symptoms worsen  Low Back Strain   AMBULATORY CARE:   Low back strain  is an injury to your lower back muscles or tendons  Tendons are strong tissues that connect muscles to bones  The lower back supports most of your body weight and helps you move, twist, and bend  Low back strain is usually caused by activities that increase stress on the lower back, such as exercise or injury  Common signs and symptoms include the following:   · Low back pain or muscle spasms    · Stiffness or limited movement    · Pain that goes down to the buttocks, groin, or legs    · Pain that is worse with activity  Seek care immediately if:   · You hear or feel a pop in your lower back  · You have increased swelling or pain in your lower back  · You have trouble moving your legs  · Your legs are numb  Contact your healthcare provider if:   · You have a fever  · Your pain does not go away, even after treatment  · You have questions or concerns about your condition or care  Treatment for low back strain:   · Acetaminophen decreases pain and fever  It is available without a doctor's order  Ask how much to take and how often to take it  Follow directions  Acetaminophen can cause liver damage if not taken correctly  · NSAIDs , such as ibuprofen, help decrease swelling, pain, and fever  This medicine is available with or without a doctor's order  NSAIDs can cause stomach bleeding or kidney problems in certain people  If you take blood thinner medicine, always ask your healthcare provider if NSAIDs are safe for you  Always read the medicine label and follow directions      · Muscle relaxers  help decrease pain and muscle spasms  · Prescription pain medicine  may be given  Ask how to take this medicine safely  · Surgery  may be needed if your strain is severe  Manage your symptoms:   · Rest  as directed  You may need to rest in bed for a period of time after your injury  Do not lift heavy objects  · Apply ice  on your back for 15 to 20 minutes every hour or as directed  Use an ice pack, or put crushed ice in a plastic bag  Cover it with a towel  Ice helps prevent tissue damage and decreases swelling and pain  · Apply heat  on your lower back for 20 to 30 minutes every 2 hours for as many days as directed  Heat helps decrease pain and muscle spasms  · Slowly start to increase your activity  as the pain decreases, or as directed  Prevent another low back strain:   · Use correct body movements  ¨ Bend at the hips and knees when you  objects  Do not bend from the waist  Use your leg muscles as you lift the load  Do not use your back  Keep the object close to your chest as you lift it  Try not to twist or lift anything above your waist     ¨ Change your position often when you stand for long periods of time  Rest one foot on a small box or footrest, and then switch to the other foot often  ¨ Try not to sit for long periods of time  When you do, sit in a straight-backed chair with your feet flat on the floor  ¨ Never reach, pull, or push while you are sitting  · Warm up before you exercise  Do exercises that strengthen your back muscles  Ask your healthcare provider about the best exercise plan for you  · Maintain a healthy weight  Ask your healthcare provider how much you should weigh  Ask him to help you create a weight loss plan if you are overweight  Follow up with your healthcare provider as directed:  Write down your questions so you remember to ask them during your visits     © 2017 [employer] Information is for End User's use only and may not be sold, redistributed or otherwise used for commercial purposes  All illustrations and images included in CareNotes® are the copyrighted property of [employer]  or [employer]  The above information is an  only  It is not intended as medical advice for individual conditions or treatments  Talk to your doctor, nurse or pharmacist before following any medical regimen to see if it is safe and effective for you  Lower Back Exercises   AMBULATORY CARE:   Lower back exercises  help heal and strengthen your back muscles to prevent another injury  Ask your healthcare provider if you need to see a physical therapist for more advanced exercises  Seek care immediately if:   · You have severe pain that prevents you from moving  Contact your healthcare provider if:   · Your pain becomes worse  · You have new pain  · You have questions or concerns about your condition or care  Do lower back exercises safely:   · Do the exercises on a mat or firm surface  (not on a bed) to support your spine and prevent low back pain  · Move slowly and smoothly  Avoid fast or jerky motions  · Breathe normally  Do not hold your breath  · Stop if you feel pain  It is normal to feel some discomfort at first  Regular exercise will help decrease your discomfort over time  Lower back exercises: Your healthcare provider may recommend that you do back exercises 10 to 30 minutes each day  He may also recommend that you do exercises 1 to 3 times each day  Ask your healthcare provider which exercises are best for you and how often to do them  · Ankle pumps:  Lie on your back  Move your foot up (with your toes pointing toward your head)  Then, move your foot down (with your toes pointing away from you)  Repeat this exercise 10 times on each side  · Heel slides:  Lie on your back  Slowly bend one leg and then straighten it  Next, bend the other leg and then straighten it  Repeat 10 times on each side  · Pelvic tilt:  Lie on your back with your knees bent and feet flat on the floor  Place your arms in a relaxed position beside your body  Tighten the muscles of your abdomen and flatten your back against the floor  Hold for 5 seconds  Repeat 5 times  · Back stretch:  Lie on your back with your hands behind your head  Bend your knees and turn the lower half of your body to one side  Hold this position for 10 seconds  Repeat 3 times on each side  · Straight leg raises:  Lie on your back with one leg straight  Bend the other knee  Tighten your abdomen and then slowly lift the straight leg up about 6 to 12 inches off the floor  Hold for 1 to 5 seconds  Lower your leg slowly  Repeat 10 times on each leg  · Knee-to-chest:  Lie on your back with your knees bent and feet flat on the floor  Pull one of your knees toward your chest and hold it there for 5 seconds  Return your leg to the starting position  Lift the other knee toward your chest and hold for 5 seconds  Do this 5 times on each side  · Cat and camel:  Place your hands and knees on the floor  Arch your back upward toward the ceiling and lower your head  Round out your spine as much as you can  Hold for 5 seconds  Lift your head upward and push your chest downward toward the floor  Hold for 5 seconds  Do 3 sets or as directed  · Wall squats:  Stand with your back against a wall  Tighten the muscles of your abdomen  Slowly lower your body until your knees are bent at a 45 degree angle  Hold this position for 5 seconds  Slowly move back up to a standing position  Repeat 10 times  · Curl up:  Lie on your back with your knees bent and feet flat on the floor  Place your hands, palms down, underneath the curve in your lower back  Next, with your elbows on the floor, lift your shoulders and chest 2 to 3 inches  Keep your head in line with your shoulders  Hold this position for 5 seconds   When you can do this exercise without pain for 10 to 15 seconds, you may add a rotation  While your shoulders and chest are lifted off the ground, turn slightly to the left and hold  Repeat on the other side  · Bird dog:  Place your hands and knees on the floor  Keep your wrists directly below your shoulders and your knees directly below your hips  Pull your belly button in toward your spine  Do not flatten or arch your back  Tighten your abdominal muscles  Raise one arm straight out so that it is aligned with your head  Next, raise the leg opposite your arm  Hold this position for 15 seconds  Lower your arm and leg slowly and change sides  Do 5 sets  © 2017 [facility] Information is for End User's use only and may not be sold, redistributed or otherwise used for commercial purposes  All illustrations and images included in CareNotes® are the copyrighted property of [employer]  or [employer]  The above information is an  only  It is not intended as medical advice for individual conditions or treatments  Talk to your doctor, nurse or pharmacist before following any medical regimen to see if it is safe and effective for you  Cervical Strain   AMBULATORY CARE:   A cervical strain  is a stretched or torn muscle or tendon in your neck  Tendons are strong tissues that connect muscles to bones  Common causes of cervical strains include a car accident, a fall, or a sports injury  Seek care immediately if:   · You have pain or numbness from your shoulder down to your hand  · You have problems with your vision, hearing, or balance  · You feel confused or cannot concentrate  · You have problems with movement and strength  Contact your healthcare provider if:   · You have increased swelling or pain in your neck  · You have questions or concerns about your condition or care    Treatment for a cervical strain  may include any of the following:  · Acetaminophen decreases pain and fever  It is available without a doctor's order  Ask how much to take and how often to take it  Follow directions  Read the labels of all other medicines you are using to see if they also contain acetaminophen, or ask your doctor or pharmacist  Acetaminophen can cause liver damage if not taken correctly  Do not use more than 4 grams (4,000 milligrams) total of acetaminophen in one day  · NSAIDs , such as ibuprofen, help decrease swelling, pain, and fever  This medicine is available with or without a doctor's order  NSAIDs can cause stomach bleeding or kidney problems in certain people  If you take blood thinner medicine, always ask your healthcare provider if NSAIDs are safe for you  Always read the medicine label and follow directions  · Muscle relaxers  help decrease pain and muscle spasms  · Prescription pain medicine  may be given  Ask your healthcare provider how to take this medicine safely  Some prescription pain medicines contain acetaminophen  Do not take other medicines that contain acetaminophen without talking to your healthcare provider  Too much acetaminophen may cause liver damage  Prescription pain medicine may cause constipation  Ask your healthcare provider how to prevent or treat constipation  · Take your medicine as directed  Contact your healthcare provider if you think your medicine is not helping or if you have side effects  Tell him or her if you are allergic to any medicine  Keep a list of the medicines, vitamins, and herbs you take  Include the amounts, and when and why you take them  Bring the list or the pill bottles to follow-up visits  Carry your medicine list with you in case of an emergency  Manage your symptoms:   · Apply heat  on your neck for 15 to 20 minutes, 4 to 6 times a day or as directed  Heat helps decrease pain, stiffness, and muscle spasms  · Begin gentle neck exercises  as soon as you can move your neck without pain   Exercises will help decrease stiffness and improve the strength and movement of your neck  Ask your healthcare provider what kind of exercises you should do  · Gradually return to your usual activities as directed  Stop if you have pain  Avoid activities that can cause more damage to your neck, such as heavy lifting or strenuous exercise  · Sleep without a pillow  to help decrease pain  Instead, roll a small towel tightly and place it under your neck  · Go to physical therapy as directed  A physical therapist teaches you exercises to help improve movement and strength, and to decrease pain  Prevent neck injury:   · Drive safely  Make sure everyone in your car wears a seatbelt  A seatbelt can save your life if you are in an accident  Do not use your cell phone when you are driving  This could distract you and cause an accident  Pull over if you need to make a call or send a text message  · Wear helmets, lifejackets, and protective gear  Always wear a helmet when you ride a bike or motorcycle, go skiing, or play sports that could cause a head injury  Wear protective equipment when you play sports  Wear a lifejacket when you are on a boat or doing water sports  Follow up with your healthcare provider as directed: You may be referred to an orthopedist or physical therapies  Write down your questions so you remember to ask them during your visits  © 2017 [employer] Information is for End User's use only and may not be sold, redistributed or otherwise used for commercial purposes  All illustrations and images included in CareNotes® are the copyrighted property of [employer]  or Orlando [employer]  The above information is an  only  It is not intended as medical advice for individual conditions or treatments  Talk to your doctor, nurse or pharmacist before following any medical regimen to see if it is safe and effective for you        Head Injury   AMBULATORY CARE:   A head injury  is most often caused by a blow to the head  This may occur from a fall, bicycle injury, sports injury, being struck in the head, or a motor vehicle accident  Signs and symptoms: You may have an open wound, swelling, or bruising on your head  Right after the injury, you may be confused  Symptoms may last anywhere from a few hours to a few weeks  You may have any of the following:  · Mild to moderate headache    · Dizziness or loss of balance    · Nausea or vomiting    · Change in mood, such as feeling restless or irritable    · Trouble thinking, remembering, or concentrating    · Ringing in the ears or neck pain    · Drowsiness or decreased amount of energy    · Trouble sleeping  Call 911 or have someone else call for any of the following:   · You cannot be woken  · You have a seizure  · You stop responding to others or you faint  · You have blurry or double vision  · Your speech becomes slurred or confused  · You have arm or leg weakness, loss of feeling, or new problems with coordination  · Your pupils are larger than usual or one pupil is a different size than the other  · You have blood or clear fluid coming out of your ears or nose  Seek care immediately if:   · You have repeated or forceful vomiting  · You feel confused  · Your headache gets worse or becomes severe  · You or someone caring for you notices that you are harder to wake than usual   Contact your healthcare provider if:   · Your symptoms last longer than 6 weeks after the injury  · You have questions or concerns about your condition or care  Medicines:   · Acetaminophen  decreases pain  Acetaminophen is available without a doctor's order  Ask how much to take and how often to take it  Follow directions  Acetaminophen can cause liver damage if not taken correctly  · Take your medicine as directed  Contact your healthcare provider if you think your medicine is not helping or if you have side effects  Tell him or her if you are allergic to any medicine  Keep a list of the medicines, vitamins, and herbs you take  Include the amounts, and when and why you take them  Bring the list or the pill bottles to follow-up visits  Carry your medicine list with you in case of an emergency  Self-care:   · Rest  or do quiet activities for 24 to 48 hours  Limit your time watching TV, using the computer, or doing tasks that require a lot of thinking  Slowly return to your normal activities as directed  Do not play sports or do activities that may cause you to get hit in the head  Ask your healthcare provider when you can return to sports  · Apply ice  on your head for 15 to 20 minutes every hour or as directed  Use an ice pack, or put crushed ice in a plastic bag  Cover it with a towel before you apply it to your skin  Ice helps prevent tissue damage and decreases swelling and pain  · Have someone stay with you for 24 hours  or as directed  This person can monitor you for complications and call [phone]  When you are awake the person should ask you a few questions to see if you are thinking clearly  An example would be to ask your name or your address  Prevent another head injury:   · Wear a helmet that fits properly  Do this when you play sports, or ride a bike, scooter, or skateboard  Helmets help decrease your risk of a serious head injury  Talk to your healthcare provider about other ways you can protect yourself if you play sports  · Wear your seat belt every time you are in a car  This helps to decrease your risk for a head injury if you are in a car accident  Follow up with your healthcare provider as directed:  Write down your questions so you remember to ask them during your visits  © 2017 [employer] Information is for End User's use only and may not be sold, redistributed or otherwise used for commercial purposes   All illustrations and images included in CareNotes® are the copyrighted property of [employer]  or [employer]  The above information is an  only  It is not intended as medical advice for individual conditions or treatments  Talk to your doctor, nurse or pharmacist before following any medical regimen to see if it is safe and effective for you

## 2019-02-15 NOTE — TELEPHONE ENCOUNTER
Message left for Pt  To call back Comp  [facility] Program at there earliest convince, our number and our hours given  Waiting for Pt  To call back

## 2019-02-15 NOTE — PROGRESS NOTES
[facility]        NAME: Judith Garg is a 59 y o  female  : 1955    MRN: [id number]  DATE: February 15, 2019  TIME: 9:18 AM    Assessment and Plan   Cervical strain, acute, initial encounter [S16  1XXA]  1  Cervical strain, acute, initial encounter  XR spine cervical 2 or 3 vw injury    methocarbamol (ROBAXIN) 750 mg tablet    predniSONE 50 mg tablet   2  Lumbar strain, initial encounter  XR spine lumbar 2 or 3 views injury    Ambulatory Referral to [facility]    methocarbamol (ROBAXIN) 750 mg tablet    predniSONE 50 mg tablet   3  Injury of head, initial encounter           Patient Instructions     Take prednisone as directed until completed  Motrin and/or Tylenol as needed for pain control  Robaxin as needed for additional pain control  Call Jeanette to schedule an appointment  [phone]  Follow-up with ambulatory Spine Center  Follow up with PCP in 3-5 days  Proceed to  ER if symptoms worsen  Chief Complaint     Chief Complaint   Patient presents with    Back Pain     S/p fall thursday and  she was pulled down 4 stairs by her dog, now c/o 9/10 constant lower back pain  Yesterday, pt slipped in her garage, fell, and hit posterior head  Denies any LOC, but c/o HA  now  History of Present Illness       81-year-old female presents for fall injuries  Patient reports on Thursday she had a fall injury down several steps because her dog pulled her and she fell backwards onto her back  And she reports on Friday have a slip and fall and her garage and fell to her back and struck her head  Denies any loss of consciousness  Had a headache last night but is going away  Denies any nausea or vomiting  No known numbness or tingling into her extremities  Patient reports she still having a lot of lower back pain and some cervical pain since the fall  Denies any bowel or bladder incontinence  Denies any blurry vision double vision    Denies any ringing in ears or decreased hearing  No dizziness  Fall   The accident occurred 5 to 7 days ago  Fall occurred: Fall down steps due to dog and slipped and fell in the garage on some ice  She fell from a height of 1 to 2 ft  She landed on hard floor  The point of impact was the neck, buttocks and head  The pain is present in the head, neck and back  The pain is moderate  The symptoms are aggravated by movement  Associated symptoms include headaches  Pertinent negatives include no abdominal pain, bowel incontinence, fever, hearing loss, hematuria, loss of consciousness, nausea, numbness, tingling, visual change or vomiting  She has tried nothing for the symptoms  The treatment provided no relief  Review of Systems   Review of Systems   Constitutional: Negative  Negative for fever  HENT: Negative  Eyes: Negative  Respiratory: Negative  Cardiovascular: Negative  Gastrointestinal: Negative  Negative for abdominal pain, bowel incontinence, nausea and vomiting  Genitourinary: Negative for hematuria  Musculoskeletal: Positive for back pain and neck pain  Skin: Negative  Neurological: Positive for headaches  Negative for dizziness, tingling, tremors, loss of consciousness, syncope, light-headedness and numbness           Current Medications       Current Outpatient Medications:     methocarbamol (ROBAXIN) 750 mg tablet, Take 1 tablet (750 mg total) by mouth every 6 (six) hours as needed for muscle spasms, Disp: 15 tablet, Rfl: 0    predniSONE 50 mg tablet, Take 1 tablet (50 mg total) by mouth daily for 5 days, Disp: 5 tablet, Rfl: 0    Probiotic Product (PROBIOTIC-10 PO), Take by mouth, Disp: , Rfl:     Current Allergies     Allergies as of 02/15/2019 - Reviewed 02/15/2019   Allergen Reaction Noted    Ciprofloxacin Nausea Only 02/01/2018    Flagyl [metronidazole] Nausea Only 02/01/2018    Flexeril [cyclobenzaprine]  03/18/2017    Flu virus vaccine  09/25/2014    Morphine 03/18/2017    Onion Hives 01/22/2015            The following portions of the patient's history were reviewed and updated as appropriate: allergies, current medications, past family history, past medical history, past social history, past surgical history and problem list      Past Medical History:   Diagnosis Date    C  difficile diarrhea     resolved 06 jun 2017    Elevated CEA     resolved 06 sep 2017    GERD (gastroesophageal reflux disease)     Reactive airway disease     RESOLVED 06 NOV 2017    Rotator cuff tear     Sleep disorder     RESOLVED 06 NOV 2017       Past Surgical History:   Procedure Laterality Date    BREAST BIOPSY      COLONOSCOPY      DILATION AND CURETTAGE OF UTERUS      ROTATOR CUFF REPAIR      WRIST FRACTURE SURGERY         Family History   Problem Relation Age of Onset    Heart disease Mother         CARDIAC DISORDER    Heart disease Father         CARDIAC DISORDER    Heart failure Father         CONGESTIVE HEART FAILURE    Stroke Father          Medications have been verified  Objective   /80   Pulse 72   Temp 97 5 °F (36 4 °C)   Resp 16   Ht 5' (1 524 m)   Wt 60 3 kg (133 lb)   SpO2 98%   BMI 25 97 kg/m²        Physical Exam     Physical Exam   Constitutional: She is oriented to person, place, and time  She appears well-developed and well-nourished  No distress  HENT:   Head: Normocephalic and atraumatic  Right Ear: Hearing, tympanic membrane, external ear and ear canal normal    Left Ear: Hearing, tympanic membrane and external ear normal    Nose: Nose normal    Mouth/Throat: Uvula is midline, oropharynx is clear and moist and mucous membranes are normal  No oropharyngeal exudate  Eyes: Pupils are equal, round, and reactive to light  Conjunctivae and EOM are normal  Right eye exhibits no discharge  Left eye exhibits no discharge  Neck: Normal range of motion  Neck supple  No tracheal deviation present  No thyromegaly present     Cardiovascular: Normal rate, regular rhythm, normal heart sounds and intact distal pulses  Exam reveals no gallop and no friction rub  No murmur heard  Pulmonary/Chest: Effort normal and breath sounds normal  No respiratory distress  She has no wheezes  She has no rales  Abdominal: Soft  Bowel sounds are normal  She exhibits no distension  There is no tenderness  There is no rebound and no guarding  Musculoskeletal: Normal range of motion  Lumbar back: She exhibits tenderness, bony tenderness and pain  She exhibits normal range of motion, no swelling, no edema, no deformity, no laceration, no spasm and normal pulse  Back:    Lymphadenopathy:     She has no cervical adenopathy  Neurological: She is alert and oriented to person, place, and time  She has normal strength  She displays normal reflexes  No cranial nerve deficit or sensory deficit  She exhibits normal muscle tone  She displays a negative Romberg sign  Coordination normal    Reflex Scores:       Patellar reflexes are 2+ on the right side and 2+ on the left side  Skin: Skin is warm and dry  Psychiatric: She has a normal mood and affect  Her behavior is normal    Nursing note and vitals reviewed  X-rays reviewed  Compared them to old ones    No acute changes in interval   Noted chronic degenerative changes

## 2019-02-20 ENCOUNTER — TELEPHONE (OUTPATIENT)
Dept: PHYSICAL THERAPY | Facility: OTHER | Age: 64
End: 2019-02-20

## 2019-02-20 NOTE — TELEPHONE ENCOUNTER
Message left with Pt  to call Comp  Spine program, our c/b number and our hours given  This was second call placed to Pt  , waiting for c/b   Referral will be closed out

## 2019-04-10 ENCOUNTER — OFFICE VISIT (OUTPATIENT)
Dept: INTERNAL MEDICINE CLINIC | Facility: CLINIC | Age: 64
End: 2019-04-10
Payer: COMMERCIAL

## 2019-04-10 VITALS
TEMPERATURE: 98.8 F | HEART RATE: 77 BPM | BODY MASS INDEX: 25.84 KG/M2 | RESPIRATION RATE: 16 BRPM | OXYGEN SATURATION: 97 % | SYSTOLIC BLOOD PRESSURE: 118 MMHG | WEIGHT: 131.6 LBS | DIASTOLIC BLOOD PRESSURE: 70 MMHG | HEIGHT: 60 IN

## 2019-04-10 DIAGNOSIS — E66.3 OVERWEIGHT (BMI 25.0-29.9): ICD-10-CM

## 2019-04-10 DIAGNOSIS — Z78.0 ASYMPTOMATIC POSTMENOPAUSAL STATE: ICD-10-CM

## 2019-04-10 DIAGNOSIS — Z00.00 ANNUAL PHYSICAL EXAM: Primary | ICD-10-CM

## 2019-04-10 PROCEDURE — 99396 PREV VISIT EST AGE 40-64: CPT | Performed by: INTERNAL MEDICINE

## 2019-04-10 RX ORDER — DICYCLOMINE HCL 20 MG
TABLET ORAL
COMMUNITY
Start: 2019-04-06 | End: 2019-09-20

## 2019-04-10 RX ORDER — SACCHAROMYCES BOULARDII 250 MG
250 CAPSULE ORAL DAILY
COMMUNITY
Start: 2018-01-09 | End: 2022-01-04

## 2019-04-15 ENCOUNTER — HOSPITAL ENCOUNTER (OUTPATIENT)
Dept: BONE DENSITY | Facility: MEDICAL CENTER | Age: 64
Discharge: HOME | End: 2019-04-15
Payer: COMMERCIAL

## 2019-04-15 DIAGNOSIS — Z78.0 ASYMPTOMATIC POSTMENOPAUSAL STATE: ICD-10-CM

## 2019-04-15 PROCEDURE — 77080 DXA BONE DENSITY AXIAL: CPT

## 2019-08-18 NOTE — PROGRESS NOTES
Assessment/Plan:    Problem List Items Addressed This Visit        Other    Insomnia     Difficulty maintaining sleep  Discussed medical therapy options, interested in trazodone 50mg qHS, titrate to effective dose up to 4tabs qHS every third night  If no response, consider clonazepam          Relevant Medications    traZODone (DESYREL) 50 mg tablet    MDD (major depressive disorder), recurrent episode ([facility] [address] ) - Primary     With anxiety  Patient is not interested in SSRIs at this time but will continue with psychotherapy  Call if symptoms worsen  Relevant Medications    traZODone (DESYREL) 50 mg tablet    Anxiety     She prefers to continue with psychotherapy but will call the office if symptoms worsen to discuss medical therapy  Subjective:      Patient ID: Eliel Garcia is a 62 y o  female  HPI  60yo female with MDD, microscopic colitis, HLD, lumbar spondylosis, IBS-D with h/o recurred C dif here for evaluation of difficulty sleeping  She is followed by [facility] for GI issues, was prescribed rifaximin, but suggested evaluation for inability to sleep  Gets 2-3 hours of sleep at night, not eating because it hurts but is gaining weight despite exercising 3x per week  She was accepted in research study for sleep at [facility] but had to drop out because she could not honor follow up visits  She has had a lot happening with her family  She is waking up from nightmares and worrying about her mother  Also feels depressed  She also has returned to therapy  Patient snores if she manages to sleep, denies witnessed apnea, nocturia, no regular morning HAs      The following portions of the patient's history were reviewed and updated as appropriate: allergies, current medications, past family history, past medical history, past social history, past surgical history and problem list     Current Outpatient Medications:     rifaximin (XIFAXAN) 550 mg tablet, Take 550 mg by mouth every 8 (eight) hours, Disp: , Rfl:     saccharomyces boulardii (FLORASTOR) 250 mg capsule, Take 250 mg by mouth 2 (two) times a day, Disp: , Rfl:     dicyclomine (BENTYL) 20 mg tablet, , Disp: , Rfl:     traZODone (DESYREL) 50 mg tablet, Take 1 tab qHS, may titrate every third night up to four tabs qHS  , Disp: 120 tablet, Rfl: 0    Review of Systems   Constitutional:        +gradual weight gain   Gastrointestinal: Positive for diarrhea  Genitourinary: Negative  Neurological: Negative for headaches  Psychiatric/Behavioral: Positive for dysphoric mood and sleep disturbance  The patient is nervous/anxious  Objective:    /76 (BP Location: Left arm, Patient Position: Sitting)   Pulse 76   Temp 98 °F (36 7 °C)   Resp 16   Ht 5' (1 524 m)   Wt 60 3 kg (133 lb)   SpO2 98%   BMI 25 97 kg/m²      Physical Exam   Constitutional: She is oriented to person, place, and time  She appears well-developed and well-nourished  No distress  Neurological: She is alert and oriented to person, place, and time  Psychiatric: She has a normal mood and affect  Her behavior is normal    Vitals reviewed

## 2019-08-19 ENCOUNTER — OFFICE VISIT (OUTPATIENT)
Dept: INTERNAL MEDICINE CLINIC | Facility: CLINIC | Age: 64
End: 2019-08-19
Payer: COMMERCIAL

## 2019-08-19 VITALS
OXYGEN SATURATION: 98 % | BODY MASS INDEX: 26.11 KG/M2 | WEIGHT: 133 LBS | DIASTOLIC BLOOD PRESSURE: 76 MMHG | HEIGHT: 60 IN | SYSTOLIC BLOOD PRESSURE: 122 MMHG | RESPIRATION RATE: 16 BRPM | HEART RATE: 76 BPM | TEMPERATURE: 98 F

## 2019-08-19 DIAGNOSIS — F41.9 ANXIETY: ICD-10-CM

## 2019-08-19 DIAGNOSIS — F51.04 PSYCHOPHYSIOLOGICAL INSOMNIA: ICD-10-CM

## 2019-08-19 DIAGNOSIS — F33.9 EPISODE OF RECURRENT MAJOR DEPRESSIVE DISORDER, UNSPECIFIED DEPRESSION EPISODE SEVERITY (HCC): Primary | ICD-10-CM

## 2019-08-19 PROCEDURE — 1036F TOBACCO NON-USER: CPT | Performed by: INTERNAL MEDICINE

## 2019-08-19 PROCEDURE — 3008F BODY MASS INDEX DOCD: CPT | Performed by: INTERNAL MEDICINE

## 2019-08-19 PROCEDURE — 99213 OFFICE O/P EST LOW 20 MIN: CPT | Performed by: INTERNAL MEDICINE

## 2019-08-19 RX ORDER — TRAZODONE HYDROCHLORIDE 50 MG/1
TABLET ORAL
Qty: 120 TABLET | Refills: 0 | Status: SHIPPED | OUTPATIENT
Start: 2019-08-19 | End: 2019-09-20

## 2019-08-19 NOTE — ASSESSMENT & PLAN NOTE
She prefers to continue with psychotherapy but will call the office if symptoms worsen to discuss medical therapy

## 2019-08-19 NOTE — ASSESSMENT & PLAN NOTE
With anxiety  Patient is not interested in SSRIs at this time but will continue with psychotherapy  Call if symptoms worsen

## 2019-08-19 NOTE — ASSESSMENT & PLAN NOTE
Difficulty maintaining sleep  Discussed medical therapy options, interested in trazodone 50mg qHS, titrate to effective dose up to 4tabs qHS every third night    If no response, consider clonazepam

## 2019-09-19 NOTE — PROGRESS NOTES
Assessment/Plan:    Problem List Items Addressed This Visit        Other    Insomnia - Primary     Difficulty with sleep maintenance  Failed melatonin  Developed vomiting with trazodone  Discussed trial of temazepam, start at 7 5mg qhs and call in 2 weeks with update  Side effects discussed  PDMP queried and appropriate  Relevant Medications    temazepam (RESTORIL) 7 5 mg capsule      Other Visit Diagnoses     Encounter for immunization        Relevant Orders    influenza vaccine, [id number], quadrivalent, recombinant, PF, 0 5 mL, for patients 18 yr+ (FLUBLOK) (Completed)        Subjective:      Patient ID: Dion Cardenas is a 62 y o  female  HPI  79-year-old female with MDD, anxiety, microscopic colitis, HLD, lumbar spondylolysis, chronic insomnia, IBS -D with recurrent C dif here for re-evaluation of insomnia  She was able to take trazodone up to 100mg to help her sleep but developed persistent vomiting  Failed melatonin in the past   Has had difficulty with sleep maintenance where she gets 2-3hrs of sleep each time  Vomiting has since stopped, no associated fever  The following portions of the patient's history were reviewed and updated as appropriate: allergies, current medications, past family history, past medical history, past social history, past surgical history and problem list     Current Outpatient Medications:     saccharomyces boulardii (FLORASTOR) 250 mg capsule, Take 250 mg by mouth 2 (two) times a day, Disp: , Rfl:     rifaximin (XIFAXAN) 550 mg tablet, Take 550 mg by mouth every 8 (eight) hours, Disp: , Rfl:     temazepam (RESTORIL) 7 5 mg capsule, Take 1 capsule (7 5 mg total) by mouth daily at bedtime as needed for sleep, Disp: 14 capsule, Rfl: 0    Review of Systems   Constitutional: Positive for fatigue  Negative for unexpected weight change  Respiratory: Negative  Cardiovascular: Negative  Neurological: Positive for headaches  Negative for dizziness  Psychiatric/Behavioral: Positive for dysphoric mood and sleep disturbance  The patient is nervous/anxious  Objective:    /78 (BP Location: Left arm, Patient Position: Sitting)   Pulse 63   Temp 97 7 °F (36 5 °C)   Resp 16   Ht 5' (1 524 m)   Wt 61 5 kg (135 lb 9 6 oz)   SpO2 97%   BMI 26 48 kg/m²      Physical Exam   Neurological: She is alert  Psychiatric: She exhibits a depressed mood  Vitals reviewed

## 2019-09-20 ENCOUNTER — OFFICE VISIT (OUTPATIENT)
Dept: INTERNAL MEDICINE CLINIC | Facility: CLINIC | Age: 64
End: 2019-09-20
Payer: COMMERCIAL

## 2019-09-20 VITALS
DIASTOLIC BLOOD PRESSURE: 78 MMHG | TEMPERATURE: 97.7 F | HEIGHT: 60 IN | OXYGEN SATURATION: 97 % | RESPIRATION RATE: 16 BRPM | HEART RATE: 63 BPM | BODY MASS INDEX: 26.62 KG/M2 | WEIGHT: 135.6 LBS | SYSTOLIC BLOOD PRESSURE: 128 MMHG

## 2019-09-20 DIAGNOSIS — Z23 ENCOUNTER FOR IMMUNIZATION: ICD-10-CM

## 2019-09-20 DIAGNOSIS — F51.04 PSYCHOPHYSIOLOGICAL INSOMNIA: Primary | ICD-10-CM

## 2019-09-20 PROCEDURE — 3008F BODY MASS INDEX DOCD: CPT | Performed by: INTERNAL MEDICINE

## 2019-09-20 PROCEDURE — 90471 IMMUNIZATION ADMIN: CPT

## 2019-09-20 PROCEDURE — 90682 RIV4 VACC RECOMBINANT DNA IM: CPT

## 2019-09-20 PROCEDURE — 99213 OFFICE O/P EST LOW 20 MIN: CPT | Performed by: INTERNAL MEDICINE

## 2019-09-20 RX ORDER — TEMAZEPAM 7.5 MG/1
7.5 CAPSULE ORAL
Qty: 14 CAPSULE | Refills: 0 | Status: SHIPPED | OUTPATIENT
Start: 2019-09-20 | End: 2020-05-11

## 2019-09-20 NOTE — ASSESSMENT & PLAN NOTE
Difficulty with sleep maintenance  Failed melatonin  Developed vomiting with trazodone  Discussed trial of temazepam, start at 7 5mg qhs and call in 2 weeks with update  Side effects discussed  PDMP queried and appropriate

## 2019-10-31 ENCOUNTER — CLINICAL SUPPORT (OUTPATIENT)
Dept: URGENT CARE | Facility: MEDICAL CENTER | Age: 64
End: 2019-10-31
Payer: COMMERCIAL

## 2019-10-31 ENCOUNTER — TRANSCRIBE ORDERS (OUTPATIENT)
Dept: ADMINISTRATIVE | Facility: HOSPITAL | Age: 64
End: 2019-10-31

## 2019-10-31 DIAGNOSIS — Z79.899 ENCOUNTER FOR LONG-TERM (CURRENT) USE OF OTHER MEDICATIONS: ICD-10-CM

## 2019-10-31 DIAGNOSIS — K58.0 IRRITABLE BOWEL SYNDROME WITH DIARRHEA: Primary | ICD-10-CM

## 2019-10-31 DIAGNOSIS — K58.0 IRRITABLE BOWEL SYNDROME WITH DIARRHEA: ICD-10-CM

## 2019-10-31 LAB
ATRIAL RATE: 60 BPM
P AXIS: 43 DEGREES
PR INTERVAL: 158 MS
QRS AXIS: -23 DEGREES
QRSD INTERVAL: 100 MS
QT INTERVAL: 426 MS
QTC INTERVAL: 426 MS
T WAVE AXIS: -1 DEGREES
VENTRICULAR RATE: 60 BPM

## 2019-10-31 PROCEDURE — 93005 ELECTROCARDIOGRAM TRACING: CPT

## 2019-10-31 PROCEDURE — 93010 ELECTROCARDIOGRAM REPORT: CPT

## 2019-12-27 ENCOUNTER — OFFICE VISIT (OUTPATIENT)
Dept: URGENT CARE | Facility: MEDICAL CENTER | Age: 64
End: 2019-12-27
Payer: COMMERCIAL

## 2019-12-27 VITALS
HEART RATE: 68 BPM | BODY MASS INDEX: 25.6 KG/M2 | TEMPERATURE: 97.3 F | DIASTOLIC BLOOD PRESSURE: 84 MMHG | WEIGHT: 127 LBS | SYSTOLIC BLOOD PRESSURE: 146 MMHG | HEIGHT: 59 IN | RESPIRATION RATE: 18 BRPM | OXYGEN SATURATION: 98 %

## 2019-12-27 DIAGNOSIS — J01.00 ACUTE NON-RECURRENT MAXILLARY SINUSITIS: Primary | ICD-10-CM

## 2019-12-27 DIAGNOSIS — R07.89 CHEST TIGHTNESS: ICD-10-CM

## 2019-12-27 PROCEDURE — 99213 OFFICE O/P EST LOW 20 MIN: CPT | Performed by: PHYSICIAN ASSISTANT

## 2019-12-27 RX ORDER — METHYLPREDNISOLONE 4 MG/1
TABLET ORAL
Qty: 1 EACH | Refills: 0 | Status: SHIPPED | OUTPATIENT
Start: 2019-12-27 | End: 2020-05-11

## 2019-12-27 RX ORDER — AMOXICILLIN AND CLAVULANATE POTASSIUM 250; 125 MG/1; MG/1
1 TABLET, FILM COATED ORAL EVERY 12 HOURS SCHEDULED
Qty: 20 TABLET | Refills: 0 | Status: SHIPPED | OUTPATIENT
Start: 2019-12-27 | End: 2020-01-06

## 2019-12-27 NOTE — PATIENT INSTRUCTIONS
Take Augmentin as prescribed  Take 1 tablet, every 12 hours (twice a day), for 10 days  Take with probiotics and take with food  Take Medrol Dose Zach as prescribed  Can use flonase for nasal congestion  Can also use Mucinex DM for congestion  Follow up with PCP in 3-5 days if symptoms do not resolve  Go to the ER if you have chest pain, arm pain, shortness of breath, new onset back pain, intractable vomiting/abdominal pain, dizziness, passing out  Sinusitis   WHAT YOU NEED TO KNOW:   Sinusitis is inflammation or infection of your sinuses  It is most often caused by a virus  Acute sinusitis may last up to 12 weeks  Chronic sinusitis lasts longer than 12 weeks  Recurrent sinusitis means you have 4 or more times in 1 year  DISCHARGE INSTRUCTIONS:   Return to the emergency department if:   · Your eye and eyelid are red, swollen, and painful  · You cannot open your eye  · You have vision changes, such as double vision  · Your eyeball bulges out or you cannot move your eye  · You are more sleepy than normal, or you notice changes in your ability to think, move, or talk  · You have a stiff neck, a fever, or a bad headache  · You have swelling of your forehead or scalp  Contact your healthcare provider if:   · Your symptoms do not improve after 3 days  · Your symptoms do not go away after 10 days  · You have nausea and are vomiting  · Your nose is bleeding  · You have questions or concerns about your condition or care  Medicines: Your symptoms may go away on their own  Your healthcare provider may recommend watchful waiting for up to 10 days before starting antibiotics  You may  need any of the following:  · Acetaminophen  decreases pain and fever  It is available without a doctor's order  Ask how much to take and how often to take it  Follow directions   Read the labels of all other medicines you are using to see if they also contain acetaminophen, or ask your doctor or pharmacist  Acetaminophen can cause liver damage if not taken correctly  Do not use more than 4 grams (4,000 milligrams) total of acetaminophen in one day  · NSAIDs , such as ibuprofen, help decrease swelling, pain, and fever  This medicine is available with or without a doctor's order  NSAIDs can cause stomach bleeding or kidney problems in certain people  If you take blood thinner medicine, always ask your healthcare provider if NSAIDs are safe for you  Always read the medicine label and follow directions  · Nasal steroid sprays  may help decrease inflammation in your nose and sinuses  · Decongestants  help reduce swelling and drain mucus in the nose and sinuses  They may help you breathe easier  · Antihistamines  help dry mucus in the nose and relieve sneezing  · Antibiotics  help treat or prevent a bacterial infection  · Take your medicine as directed  Contact your healthcare provider if you think your medicine is not helping or if you have side effects  Tell him or her if you are allergic to any medicine  Keep a list of the medicines, vitamins, and herbs you take  Include the amounts, and when and why you take them  Bring the list or the pill bottles to follow-up visits  Carry your medicine list with you in case of an emergency  Self-care:   · Rinse your sinuses  Use a sinus rinse device to rinse your nasal passages with a saline (salt water) solution or distilled water  Do not use tap water  This will help thin the mucus in your nose and rinse away pollen and dirt  It will also help reduce swelling so you can breathe normally  Ask your healthcare provider how often to do this  · Breathe in steam   Heat a bowl of water until you see steam  Lean over the bowl and make a tent over your head with a large towel  Breathe deeply for about 20 minutes  Be careful not to get too close to the steam or burn yourself  Do this 3 times a day  You can also breathe deeply when you take a hot shower       · Sleep with your head elevated  Place an extra pillow under your head before you go to sleep to help your sinuses drain  · Drink liquids as directed  Ask your healthcare provider how much liquid to drink each day and which liquids are best for you  Liquids will thin the mucus in your nose and help it drain  Avoid drinks that contain alcohol or caffeine  · Do not smoke, and avoid secondhand smoke  Nicotine and other chemicals in cigarettes and cigars can make your symptoms worse  Ask your healthcare provider for information if you currently smoke and need help to quit  E-cigarettes or smokeless tobacco still contain nicotine  Talk to your healthcare provider before you use these products  Prevent the spread of germs that cause sinusitis:  Wash your hands often with soap and water  Wash your hands after you use the bathroom, change a child's diaper, or sneeze  Wash your hands before you prepare or eat food  Follow up with your healthcare provider as directed: You may be referred to an ear, nose, and throat specialist  Write down your questions so you remember to ask them during your visits  © 2017 [facility] Information is for End User's use only and may not be sold, redistributed or otherwise used for commercial purposes  All illustrations and images included in CareNotes® are the copyrighted property of A [employer]  or [employer] Sanab[employer]  The above information is an  only  It is not intended as medical advice for individual conditions or treatments  Talk to your doctor, nurse or pharmacist before following any medical regimen to see if it is safe and effective for you

## 2019-12-27 NOTE — PROGRESS NOTES
[facility]        NAME: Lake Dey is a 62 y o  female  : 1955    MRN: [id number]  DATE: 2019  TIME: 8:32 AM    Assessment and Plan   Acute non-recurrent maxillary sinusitis [J01 00]  1  Acute non-recurrent maxillary sinusitis  amoxicillin-clavulanate (AUGMENTIN) 250-125 mg per tablet   2  Chest tightness  methylPREDNISolone 4 MG tablet therapy pack     Assessment/Plan:  1  Acute non-recurrent maxillary sinusitis   - Take Augmentin as prescribed  Take 1 tablet, every 12 hours (twice a day), for 10 days  Take with probiotics and take with food  Can use flonase for nasal congestion  Can also use Mucinex DM for congestion  Follow up with PCP in 3-5 days if symptoms do not resolve  Go to the ER if you have chest pain, arm pain, shortness of breath, new onset back pain, intractable vomiting/abdominal pain, dizziness, passing out  2  Chest tightness  - Take medrol dose pack as prescribed for chest tightness  Follow up with PCP if symptoms of chest tightness do not resolve  May need albuterol inhaler for asthma  Go to the ER if symptoms become more severe  Patient Instructions       Follow up with PCP in 3-5 days  Proceed to  ER if symptoms worsen  Chief Complaint     Chief Complaint   Patient presents with    Cold Like Symptoms     Patient c/o cough, nasal congestion , post nasal drip, and sore throat x 1 month        History of Present Illness       Pt is a 62 y o female presenting to urgent care with nasal congestion and cough x 4 weeks  Pt states she started with "the sniffles" and thought it would go away on its own  Her cough is a dry cough  The congestion is worst across her cheeks and into her nose  Associated symptoms include PND and sore throat  Pt states she feels her throat feels "swollen or inflamed"  She is concerned because she kept waking up last night catching her breath   Pt denies fever, chills, chest pain, arm pain, jaw pain, back pain, abdominal pain, N/V/D, ear pain/fullness  Pt has hx of asthma but states that she hasn't used her inhaler in years  She is tried using cold ease and vitamin C for her symptoms  She is UTD with flu  She states she has been around sick contacts at home  Her mother and her grandchildren have been sick  Review of Systems   Review of Systems   Constitutional: Negative for activity change, appetite change, chills, diaphoresis, fatigue and fever  HENT: Positive for congestion, postnasal drip, sinus pressure, sinus pain and sore throat  Negative for ear discharge, ear pain, hearing loss, rhinorrhea and trouble swallowing  Eyes: Negative for pain, discharge, redness and itching  Respiratory: Positive for cough, chest tightness and shortness of breath (positive for difficulty breathing while asleep at night due to chest tightness)  Negative for wheezing and stridor  Cardiovascular: Negative for chest pain, palpitations and leg swelling  Gastrointestinal: Negative for abdominal distention, abdominal pain, diarrhea, nausea and vomiting  Musculoskeletal: Negative for arthralgias, myalgias, neck pain and neck stiffness  Skin: Negative for color change, pallor and rash  Neurological: Negative for dizziness, syncope, weakness, light-headedness, numbness and headaches           Current Medications       Current Outpatient Medications:     amoxicillin-clavulanate (AUGMENTIN) 250-125 mg per tablet, Take 1 tablet by mouth every 12 (twelve) hours for 10 days, Disp: 20 tablet, Rfl: 0    methylPREDNISolone 4 MG tablet therapy pack, Use as directed on package, Disp: 1 each, Rfl: 0    rifaximin (XIFAXAN) 550 mg tablet, Take 550 mg by mouth every 8 (eight) hours, Disp: , Rfl:     saccharomyces boulardii (FLORASTOR) 250 mg capsule, Take 250 mg by mouth 2 (two) times a day, Disp: , Rfl:     temazepam (RESTORIL) 7 5 mg capsule, Take 1 capsule (7 5 mg total) by mouth daily at bedtime as needed for sleep, Disp: 14 capsule, Rfl: 0    Current Allergies     Allergies as of 12/27/2019 - Reviewed 12/27/2019   Allergen Reaction Noted    Ciprofloxacin Nausea Only 02/01/2018    Flagyl [metronidazole] Nausea Only 02/01/2018    Flexeril [cyclobenzaprine]  03/18/2017    Flu virus vaccine  09/25/2014    Morphine  03/18/2017    Onion Hives 01/22/2015            The following portions of the patient's history were reviewed and updated as appropriate: allergies, current medications, past family history, past medical history, past social history, past surgical history and problem list      Past Medical History:   Diagnosis Date    C  difficile diarrhea     resolved 06 jun 2017    Elevated CEA     resolved 06 sep 2017    GERD (gastroesophageal reflux disease)     Reactive airway disease     RESOLVED 06 NOV 2017    Rotator cuff tear     Sleep disorder     RESOLVED 06 NOV 2017       Past Surgical History:   Procedure Laterality Date    BREAST BIOPSY      COLONOSCOPY      DILATION AND CURETTAGE OF UTERUS      ROTATOR CUFF REPAIR      WRIST FRACTURE SURGERY         Family History   Problem Relation Age of Onset    Heart disease Mother         CARDIAC DISORDER    Heart disease Father         CARDIAC DISORDER    Heart failure Father         CONGESTIVE HEART FAILURE    Stroke Father        Medications have been verified  Objective   /84   Pulse 68   Temp (!) 97 3 °F (36 3 °C)   Resp 18   Ht 4' 11" (1 499 m)   Wt 57 6 kg (127 lb)   SpO2 98%   BMI 25 65 kg/m²        Physical Exam     Physical Exam   Constitutional: She is oriented to person, place, and time  She appears well-developed and well-nourished  No distress  HENT:   Head: Normocephalic and atraumatic  Right Ear: Hearing, tympanic membrane, external ear and ear canal normal    Left Ear: Hearing, tympanic membrane, external ear and ear canal normal    Nose: Mucosal edema and rhinorrhea present  Right sinus exhibits maxillary sinus tenderness   Right sinus exhibits no frontal sinus tenderness  Left sinus exhibits maxillary sinus tenderness  Left sinus exhibits no frontal sinus tenderness  Mouth/Throat: Posterior oropharyngeal edema and posterior oropharyngeal erythema present  No oropharyngeal exudate  Tonsils are 2+ on the right  Tonsils are 2+ on the left  No tonsillar exudate  PND noted in posterior oropharynx   Eyes: Conjunctivae are normal  Right eye exhibits no discharge  Left eye exhibits no discharge  Neck: Normal range of motion  Neck supple  Cardiovascular: Normal rate, regular rhythm, normal heart sounds and intact distal pulses  No murmur heard  Pulmonary/Chest: Effort normal and breath sounds normal  No stridor  No respiratory distress  She has no wheezes  She exhibits no tenderness  Musculoskeletal: Normal range of motion  She exhibits no edema or tenderness  Lymphadenopathy:     She has cervical adenopathy  Neurological: She is alert and oriented to person, place, and time  Skin: Skin is warm and dry  No rash noted  She is not diaphoretic  No erythema  No pallor  Nursing note and vitals reviewed

## 2020-03-06 ENCOUNTER — OFFICE VISIT (OUTPATIENT)
Dept: INTERNAL MEDICINE CLINIC | Facility: CLINIC | Age: 65
End: 2020-03-06
Payer: COMMERCIAL

## 2020-03-06 VITALS
DIASTOLIC BLOOD PRESSURE: 80 MMHG | WEIGHT: 133 LBS | SYSTOLIC BLOOD PRESSURE: 142 MMHG | HEART RATE: 84 BPM | TEMPERATURE: 98.8 F | BODY MASS INDEX: 26.81 KG/M2 | RESPIRATION RATE: 16 BRPM | OXYGEN SATURATION: 98 % | HEIGHT: 59 IN

## 2020-03-06 DIAGNOSIS — J40 BRONCHITIS: Primary | ICD-10-CM

## 2020-03-06 PROCEDURE — 1036F TOBACCO NON-USER: CPT | Performed by: NURSE PRACTITIONER

## 2020-03-06 PROCEDURE — 3008F BODY MASS INDEX DOCD: CPT | Performed by: NURSE PRACTITIONER

## 2020-03-06 PROCEDURE — 99213 OFFICE O/P EST LOW 20 MIN: CPT | Performed by: NURSE PRACTITIONER

## 2020-03-06 RX ORDER — AZITHROMYCIN 250 MG/1
TABLET, FILM COATED ORAL
Qty: 6 TABLET | Refills: 0 | Status: SHIPPED | OUTPATIENT
Start: 2020-03-06 | End: 2020-03-11

## 2020-03-06 NOTE — ASSESSMENT & PLAN NOTE
Pt has had a productive cough with large amount of mucus production for 1 week without improvement  She has been afebrile, no shortness of breath  Lungs are clear, vitals are stable  Probably viral infection and explained that these run their course over 7-10 days generally but cough can persist   Given her symptoms and her report that she is not improving, will treat with azithromycin  She may continue with OTC treatment as needed    Pt instructed to call for reevaluation if sx worsen or persist

## 2020-03-06 NOTE — PROGRESS NOTES
Assessment/Plan:    Bronchitis  Pt has had a productive cough with large amount of mucus production for 1 week without improvement  She has been afebrile, no shortness of breath  Lungs are clear, vitals are stable  Probably viral infection and explained that these run their course over 7-10 days generally but cough can persist   Given her symptoms and her report that she is not improving, will treat with azithromycin  She may continue with OTC treatment as needed  Pt instructed to call for reevaluation if sx worsen or persist          Diagnoses and all orders for this visit:    Bronchitis  -     azithromycin (ZITHROMAX) 250 mg tablet; Take 2 tablets on day 1 of treatment, then 1 tablet daily x 4 days          Subjective:      Patient ID: Le Ferrer is a 62 y o  female  Pt is a 62 y o  y/o female who is seen today for evaluation of cold symptoms x 1 week  She states she was watching her granddaughter last Monday and she had a cold and by Friday, she developed a cough with chest congestion and post nasal drip  She denies fever/chills, headache, body aches, ear pain, sore throat  Her appetite is normal, she does not have nausea but on 3 mornings, she has vomited due to mucus build up over night  She has tried major allergy at the recommendation of her pharmacist to help dry up the mucus which was helping but she says that this was making her too dry  She is not taking anything otc at this point  She reports travel to [facility] late [address] February  The following portions of the patient's history were reviewed and updated as appropriate: allergies, current medications, past family history, past medical history, past social history, past surgical history and problem list     Review of Systems   Constitutional: Negative for appetite change, chills, fatigue and fever  HENT: Positive for postnasal drip  Negative for congestion, ear pain, sinus pressure and sore throat      Respiratory: Positive for cough and chest tightness  Negative for shortness of breath and wheezing  Cardiovascular: Negative for chest pain, palpitations and leg swelling  Gastrointestinal: Positive for vomiting  Negative for abdominal pain, diarrhea and nausea  Musculoskeletal: Negative for myalgias  Neurological: Negative for dizziness and headaches  Hematological: Negative for adenopathy  Psychiatric/Behavioral: Negative for sleep disturbance  Objective:      /80   Pulse 84   Temp 98 8 °F (37 1 °C)   Resp 16   Ht 4' 11" (1 499 m)   Wt 60 3 kg (133 lb)   SpO2 98%   BMI 26 86 kg/m²          Physical Exam   Constitutional: She is oriented to person, place, and time  Vital signs are normal  She appears well-developed and well-nourished  She is cooperative  HENT:   Right Ear: Hearing, tympanic membrane, external ear and ear canal normal  Tympanic membrane is not bulging  No middle ear effusion  Left Ear: Hearing, tympanic membrane, external ear and ear canal normal  Tympanic membrane is not bulging  No middle ear effusion  Nose: No mucosal edema or rhinorrhea  Mouth/Throat: Mucous membranes are not dry  No oropharyngeal exudate, posterior oropharyngeal edema, posterior oropharyngeal erythema or tonsillar abscesses  Eyes: Conjunctivae are normal    Cardiovascular: Normal rate, regular rhythm, normal heart sounds and intact distal pulses  No murmur heard  Pulmonary/Chest: Effort normal and breath sounds normal  No accessory muscle usage  No respiratory distress  She has no decreased breath sounds  She has no wheezes  She has no rhonchi  She has no rales  Musculoskeletal: She exhibits no edema  Lymphadenopathy:        Head (right side): No submental, no submandibular, no tonsillar, no preauricular, no posterior auricular and no occipital adenopathy present          Head (left side): No submental, no submandibular, no tonsillar, no preauricular, no posterior auricular and no occipital adenopathy present  She has no cervical adenopathy  Neurological: She is alert and oriented to person, place, and time  Skin: Skin is warm, dry and intact  Psychiatric: She has a normal mood and affect  Her speech is normal and behavior is normal  Judgment and thought content normal  Cognition and memory are normal    Vitals reviewed

## 2020-05-11 ENCOUNTER — TELEMEDICINE (OUTPATIENT)
Dept: INTERNAL MEDICINE CLINIC | Facility: CLINIC | Age: 65
End: 2020-05-11
Payer: COMMERCIAL

## 2020-05-11 VITALS — BODY MASS INDEX: 25.45 KG/M2 | HEART RATE: 79 BPM | WEIGHT: 126 LBS

## 2020-05-11 DIAGNOSIS — Z20.822 ENCOUNTER FOR LABORATORY TESTING FOR COVID-19 VIRUS: ICD-10-CM

## 2020-05-11 DIAGNOSIS — Z71.89 ADVICE GIVEN ABOUT COVID-19 VIRUS INFECTION: Primary | ICD-10-CM

## 2020-05-11 PROCEDURE — 99213 OFFICE O/P EST LOW 20 MIN: CPT | Performed by: INTERNAL MEDICINE

## 2020-05-11 RX ORDER — FAMOTIDINE 20 MG/1
20 TABLET, FILM COATED ORAL 2 TIMES DAILY
COMMUNITY
Start: 2020-04-07 | End: 2022-01-04

## 2020-05-11 RX ORDER — AMITRIPTYLINE HYDROCHLORIDE 10 MG/1
10 TABLET, FILM COATED ORAL DAILY
COMMUNITY
Start: 2020-04-27 | End: 2022-01-04

## 2020-05-12 ENCOUNTER — APPOINTMENT (OUTPATIENT)
Dept: LAB | Facility: CLINIC | Age: 65
End: 2020-05-12
Payer: COMMERCIAL

## 2020-05-12 DIAGNOSIS — Z20.822 ENCOUNTER FOR LABORATORY TESTING FOR COVID-19 VIRUS: ICD-10-CM

## 2020-05-12 PROCEDURE — 86769 SARS-COV-2 COVID-19 ANTIBODY: CPT

## 2020-05-13 LAB
SARS-COV-2 IGG SERPL QL IA: NEGATIVE
SARS-COV-2 IGM SERPL QL IA: NEGATIVE

## 2020-06-12 ENCOUNTER — OFFICE VISIT (OUTPATIENT)
Dept: INTERNAL MEDICINE CLINIC | Facility: CLINIC | Age: 65
End: 2020-06-12
Payer: COMMERCIAL

## 2020-06-12 VITALS
HEART RATE: 83 BPM | HEIGHT: 59 IN | DIASTOLIC BLOOD PRESSURE: 72 MMHG | BODY MASS INDEX: 26.21 KG/M2 | OXYGEN SATURATION: 98 % | RESPIRATION RATE: 16 BRPM | TEMPERATURE: 99.3 F | WEIGHT: 130 LBS | SYSTOLIC BLOOD PRESSURE: 120 MMHG

## 2020-06-12 DIAGNOSIS — D75.89 MACROCYTOSIS: ICD-10-CM

## 2020-06-12 DIAGNOSIS — Z11.4 SCREENING FOR HIV (HUMAN IMMUNODEFICIENCY VIRUS): ICD-10-CM

## 2020-06-12 DIAGNOSIS — E78.00 HYPERCHOLESTEROLEMIA: ICD-10-CM

## 2020-06-12 DIAGNOSIS — Z00.00 ANNUAL PHYSICAL EXAM: Primary | ICD-10-CM

## 2020-06-12 PROBLEM — Z20.822 ENCOUNTER FOR LABORATORY TESTING FOR COVID-19 VIRUS: Status: RESOLVED | Noted: 2020-05-11 | Resolved: 2020-06-12

## 2020-06-12 PROBLEM — Z71.89 ADVICE GIVEN ABOUT COVID-19 VIRUS INFECTION: Status: RESOLVED | Noted: 2020-05-11 | Resolved: 2020-06-12

## 2020-06-12 PROBLEM — J40 BRONCHITIS: Status: RESOLVED | Noted: 2020-03-06 | Resolved: 2020-06-12

## 2020-06-12 PROCEDURE — 3008F BODY MASS INDEX DOCD: CPT | Performed by: INTERNAL MEDICINE

## 2020-06-12 PROCEDURE — 99396 PREV VISIT EST AGE 40-64: CPT | Performed by: INTERNAL MEDICINE

## 2020-06-16 ENCOUNTER — TRANSCRIBE ORDERS (OUTPATIENT)
Dept: LAB | Facility: CLINIC | Age: 65
End: 2020-06-16

## 2020-06-16 ENCOUNTER — APPOINTMENT (OUTPATIENT)
Dept: LAB | Facility: CLINIC | Age: 65
End: 2020-06-16
Payer: COMMERCIAL

## 2020-06-16 DIAGNOSIS — R11.2 NAUSEA AND VOMITING, INTRACTABILITY OF VOMITING NOT SPECIFIED, UNSPECIFIED VOMITING TYPE: ICD-10-CM

## 2020-06-16 DIAGNOSIS — Z11.4 SCREENING FOR HIV (HUMAN IMMUNODEFICIENCY VIRUS): ICD-10-CM

## 2020-06-16 DIAGNOSIS — R10.13 ABDOMINAL PAIN, EPIGASTRIC: ICD-10-CM

## 2020-06-16 DIAGNOSIS — K21.9 GASTROESOPHAGEAL REFLUX DISEASE, ESOPHAGITIS PRESENCE NOT SPECIFIED: Primary | ICD-10-CM

## 2020-06-16 DIAGNOSIS — E78.00 HYPERCHOLESTEROLEMIA: ICD-10-CM

## 2020-06-16 DIAGNOSIS — D75.89 MACROCYTOSIS: ICD-10-CM

## 2020-06-16 LAB
ALBUMIN SERPL BCP-MCNC: 4.2 G/DL (ref 3.5–5)
ALP SERPL-CCNC: 80 U/L (ref 46–116)
ALT SERPL W P-5'-P-CCNC: 51 U/L (ref 12–78)
ANION GAP SERPL CALCULATED.3IONS-SCNC: 7 MMOL/L (ref 4–13)
AST SERPL W P-5'-P-CCNC: 29 U/L (ref 5–45)
BASOPHILS # BLD AUTO: 0.03 THOUSANDS/ΜL (ref 0–0.1)
BASOPHILS NFR BLD AUTO: 1 % (ref 0–1)
BILIRUB SERPL-MCNC: 0.31 MG/DL (ref 0.2–1)
BUN SERPL-MCNC: 20 MG/DL (ref 5–25)
CALCIUM SERPL-MCNC: 8.7 MG/DL (ref 8.3–10.1)
CHLORIDE SERPL-SCNC: 104 MMOL/L (ref 100–108)
CHOLEST SERPL-MCNC: 230 MG/DL (ref 50–200)
CO2 SERPL-SCNC: 26 MMOL/L (ref 21–32)
CREAT SERPL-MCNC: 0.82 MG/DL (ref 0.6–1.3)
EOSINOPHIL # BLD AUTO: 0.08 THOUSAND/ΜL (ref 0–0.61)
EOSINOPHIL NFR BLD AUTO: 2 % (ref 0–6)
ERYTHROCYTE [DISTWIDTH] IN BLOOD BY AUTOMATED COUNT: 14 % (ref 11.6–15.1)
GFR SERPL CREATININE-BSD FRML MDRD: 76 ML/MIN/1.73SQ M
GLUCOSE P FAST SERPL-MCNC: 90 MG/DL (ref 65–99)
HCT VFR BLD AUTO: 37.8 % (ref 34.8–46.1)
HDLC SERPL-MCNC: 84 MG/DL
HGB BLD-MCNC: 13.8 G/DL (ref 11.5–15.4)
IMM GRANULOCYTES # BLD AUTO: 0 THOUSAND/UL (ref 0–0.2)
IMM GRANULOCYTES NFR BLD AUTO: 0 % (ref 0–2)
LDLC SERPL CALC-MCNC: 116 MG/DL (ref 0–100)
LYMPHOCYTES # BLD AUTO: 2.88 THOUSANDS/ΜL (ref 0.6–4.47)
LYMPHOCYTES NFR BLD AUTO: 56 % (ref 14–44)
MCH RBC QN AUTO: 38.1 PG (ref 26.8–34.3)
MCHC RBC AUTO-ENTMCNC: 36.5 G/DL (ref 31.4–37.4)
MCV RBC AUTO: 104 FL (ref 82–98)
MONOCYTES # BLD AUTO: 0.38 THOUSAND/ΜL (ref 0.17–1.22)
MONOCYTES NFR BLD AUTO: 8 % (ref 4–12)
NEUTROPHILS # BLD AUTO: 1.68 THOUSANDS/ΜL (ref 1.85–7.62)
NEUTS SEG NFR BLD AUTO: 33 % (ref 43–75)
NONHDLC SERPL-MCNC: 146 MG/DL
NRBC BLD AUTO-RTO: 0 /100 WBCS
PLATELET # BLD AUTO: 224 THOUSANDS/UL (ref 149–390)
PMV BLD AUTO: 10.5 FL (ref 8.9–12.7)
POTASSIUM SERPL-SCNC: 3.4 MMOL/L (ref 3.5–5.3)
PROT SERPL-MCNC: 7.5 G/DL (ref 6.4–8.2)
RBC # BLD AUTO: 3.62 MILLION/UL (ref 3.81–5.12)
SODIUM SERPL-SCNC: 137 MMOL/L (ref 136–145)
TRIGL SERPL-MCNC: 150 MG/DL
TSH SERPL DL<=0.05 MIU/L-ACNC: 1.61 UIU/ML (ref 0.36–3.74)
WBC # BLD AUTO: 5.05 THOUSAND/UL (ref 4.31–10.16)

## 2020-06-16 PROCEDURE — 85025 COMPLETE CBC W/AUTO DIFF WBC: CPT

## 2020-06-16 PROCEDURE — 80053 COMPREHEN METABOLIC PANEL: CPT

## 2020-06-16 PROCEDURE — 84443 ASSAY THYROID STIM HORMONE: CPT

## 2020-06-16 PROCEDURE — 80061 LIPID PANEL: CPT

## 2020-06-16 PROCEDURE — 36415 COLL VENOUS BLD VENIPUNCTURE: CPT

## 2020-06-16 PROCEDURE — 87389 HIV-1 AG W/HIV-1&-2 AB AG IA: CPT

## 2020-06-17 LAB — HIV 1+2 AB+HIV1 P24 AG SERPL QL IA: NORMAL

## 2020-06-18 ENCOUNTER — TELEPHONE (OUTPATIENT)
Dept: ADMINISTRATIVE | Facility: OTHER | Age: 65
End: 2020-06-18

## 2020-06-26 ENCOUNTER — APPOINTMENT (OUTPATIENT)
Dept: LAB | Facility: CLINIC | Age: 65
End: 2020-06-26
Payer: COMMERCIAL

## 2020-06-26 DIAGNOSIS — R10.13 ABDOMINAL PAIN, EPIGASTRIC: ICD-10-CM

## 2020-06-26 DIAGNOSIS — K21.9 GASTROESOPHAGEAL REFLUX DISEASE, ESOPHAGITIS PRESENCE NOT SPECIFIED: ICD-10-CM

## 2020-06-26 DIAGNOSIS — R11.2 NAUSEA AND VOMITING, INTRACTABILITY OF VOMITING NOT SPECIFIED, UNSPECIFIED VOMITING TYPE: ICD-10-CM

## 2020-06-26 PROCEDURE — 87338 HPYLORI STOOL AG IA: CPT

## 2020-06-28 LAB — H PYLORI AG STL QL IA: NEGATIVE

## 2020-12-29 ENCOUNTER — TELEPHONE (OUTPATIENT)
Dept: INTERNAL MEDICINE CLINIC | Facility: CLINIC | Age: 65
End: 2020-12-29

## 2021-06-08 ENCOUNTER — RA CDI HCC (OUTPATIENT)
Dept: OTHER | Facility: HOSPITAL | Age: 66
End: 2021-06-08

## 2021-06-08 NOTE — PROGRESS NOTES
[facility] SwitchNote  coding opportunities             Chart reviewed, (number of) suggestions sent to provider: 1            Number of suggestions actually used: 0      Number of suggestions NOT actually used: 1     Patients insurance company: [employer] (Medicare Advantage and Commercial)     Visit status: Patient arrived for their scheduled appointment   dx not used: F33 9  Provider never responded to [place] SwitchNote  coding request     [facility] SwitchNote  coding opportunities             Chart reviewed, (number of) suggestions sent to provider: 1      DX:  F33 9-Major depressive disorder, recurrent, unspecified-on elavil       Patients insurance company: [employer] ([employer])

## 2021-06-14 ENCOUNTER — OFFICE VISIT (OUTPATIENT)
Dept: INTERNAL MEDICINE CLINIC | Facility: CLINIC | Age: 66
End: 2021-06-14
Payer: MEDICARE

## 2021-06-14 VITALS
OXYGEN SATURATION: 99 % | RESPIRATION RATE: 16 BRPM | BODY MASS INDEX: 27.13 KG/M2 | WEIGHT: 134.6 LBS | HEART RATE: 75 BPM | HEIGHT: 59 IN | TEMPERATURE: 97.6 F | SYSTOLIC BLOOD PRESSURE: 122 MMHG | DIASTOLIC BLOOD PRESSURE: 70 MMHG

## 2021-06-14 DIAGNOSIS — Z00.00 WELCOME TO MEDICARE PREVENTIVE VISIT: Primary | ICD-10-CM

## 2021-06-14 DIAGNOSIS — Z23 ENCOUNTER FOR IMMUNIZATION: ICD-10-CM

## 2021-06-14 PROCEDURE — 1123F ACP DISCUSS/DSCN MKR DOCD: CPT | Performed by: INTERNAL MEDICINE

## 2021-06-14 PROCEDURE — G0403 EKG FOR INITIAL PREVENT EXAM: HCPCS | Performed by: INTERNAL MEDICINE

## 2021-06-14 PROCEDURE — 90670 PCV13 VACCINE IM: CPT

## 2021-06-14 PROCEDURE — G0009 ADMIN PNEUMOCOCCAL VACCINE: HCPCS

## 2021-06-14 PROCEDURE — G0402 INITIAL PREVENTIVE EXAM: HCPCS | Performed by: INTERNAL MEDICINE

## 2021-06-14 RX ORDER — CELECOXIB 200 MG/1
200 CAPSULE ORAL DAILY
COMMUNITY
Start: 2021-04-07 | End: 2022-01-04

## 2021-06-14 RX ORDER — CETIRIZINE HYDROCHLORIDE 10 MG/1
10 TABLET ORAL DAILY
COMMUNITY

## 2021-06-14 NOTE — PATIENT INSTRUCTIONS
Medicare Preventive Visit Patient Instructions  Thank you for completing your Welcome to Medicare Visit or Medicare Annual Wellness Visit today  Your next wellness visit will be due in one year (6/15/2022)  The screening/preventive services that you may require over the next 5-10 years are detailed below  Some tests may not apply to you based off risk factors and/or age  Screening tests ordered at today's visit but not completed yet may show as past due  Also, please note that scanned in results may not display below  Preventive Screenings:  Service Recommendations Previous Testing/Comments   Colorectal Cancer Screening  * Colonoscopy    * Fecal Occult Blood Test (FOBT)/Fecal Immunochemical Test (FIT)  * Fecal DNA/Cologuard Test  * Flexible Sigmoidoscopy Age: 54-65 years old   Colonoscopy: every 10 years (may be performed more frequently if at higher risk)  OR  FOBT/FIT: every 1 year  OR  Cologuard: every 3 years  OR  Sigmoidoscopy: every 5 years  Screening may be recommended earlier than age 48 if at higher risk for colorectal cancer  Also, an individualized decision between you and your healthcare provider will decide whether screening between the ages of 74-80 would be appropriate  Colonoscopy: 01/12/2017  FOBT/FIT: Not on file  Cologuard: Not on file  Sigmoidoscopy: Not on file    Screening Current     Breast Cancer Screening Age: 36 years old  Frequency: every 1-2 years  Not required if history of left and right mastectomy Mammogram: 02/20/2020    Screening Current   Cervical Cancer Screening Between the ages of 21-29, pap smear recommended once every 3 years  Between the ages of 33-67, can perform pap smear with HPV co-testing every 5 years     Recommendations may differ for women with a history of total hysterectomy, cervical cancer, or abnormal pap smears in past  Pap Smear: Not on file    Screening Not Indicated   Hepatitis C Screening Once for adults born between 1945 and 1965  More frequently in patients at high risk for Hepatitis C Hep C Antibody: 08/02/2018    Screening Current   Diabetes Screening 1-2 times per year if you're at risk for diabetes or have pre-diabetes Fasting glucose: 90 mg/dL   A1C: No results in last 5 years    Screening Current   Cholesterol Screening Once every 5 years if you don't have a lipid disorder  May order more often based on risk factors  Lipid panel: 06/16/2020    Screening Not Indicated  History Lipid Disorder     Other Preventive Screenings Covered by Medicare:  1  Abdominal Aortic Aneurysm (AAA) Screening: covered once if your at risk  You're considered to be at risk if you have a family history of AAA  2  Lung Cancer Screening: covers low dose CT scan once per year if you meet all of the following conditions: (1) Age 50-69; (2) No signs or symptoms of lung cancer; (3) Current smoker or have quit smoking within the last 15 years; (4) You have a tobacco smoking history of at least 30 pack years (packs per day multiplied by number of years you smoked); (5) You get a written order from a healthcare provider  3  Glaucoma Screening: covered annually if you're considered high risk: (1) You have diabetes OR (2) Family history of glaucoma OR (3)  aged 48 and older OR (3)  American aged 72 and older  3  Osteoporosis Screening: covered every 2 years if you meet one of the following conditions: (1) You're estrogen deficient and at risk for osteoporosis based off medical history and other findings; (2) Have a vertebral abnormality; (3) On glucocorticoid therapy for more than 3 months; (4) Have primary hyperparathyroidism; (5) On osteoporosis medications and need to assess response to drug therapy  · Last bone density test (DXA Scan): 04/23/2019   5  HIV Screening: covered annually if you're between the age of 15-65  Also covered annually if you are younger than 13 and older than 72 with risk factors for HIV infection   For pregnant patients, it is covered up to 3 times per pregnancy  Immunizations:  Immunization Recommendations   Influenza Vaccine Annual influenza vaccination during flu season is recommended for all persons aged >= 6 months who do not have contraindications   Pneumococcal Vaccine (Prevnar and Pneumovax)  * Prevnar = PCV13  * Pneumovax = PPSV23   Adults 25-60 years old: 1-3 doses may be recommended based on certain risk factors  Adults 72 years old: Prevnar (PCV13) vaccine recommended followed by Pneumovax (PPSV23) vaccine  If already received PPSV23 since turning 65, then PCV13 recommended at least one year after PPSV23 dose  Hepatitis B Vaccine 3 dose series if at intermediate or high risk (ex: diabetes, end stage renal disease, liver disease)   Tetanus (Td) Vaccine - COST NOT COVERED BY MEDICARE PART B Following completion of primary series, a booster dose should be given every 10 years to maintain immunity against tetanus  Td may also be given as tetanus wound prophylaxis  Tdap Vaccine - COST NOT COVERED BY MEDICARE PART B Recommended at least once for all adults  For pregnant patients, recommended with each pregnancy  Shingles Vaccine (Shingrix) - COST NOT COVERED BY MEDICARE PART B  2 shot series recommended in those aged 48 and above     Health Maintenance Due:      Topic Date Due    MAMMOGRAM  02/20/2021    Colorectal Cancer Screening  01/12/2027    HIV Screening  Completed    Hepatitis C Screening  Completed     Immunizations Due:      Topic Date Due    Pneumococcal Vaccine: 65+ Years (1 of 1 - PPSV23) Never done     Advance Directives   What are advance directives? Advance directives are legal documents that state your wishes and plans for medical care  These plans are made ahead of time in case you lose your ability to make decisions for yourself  Advance directives can apply to any medical decision, such as the treatments you want, and if you want to donate organs  What are the types of advance directives?   There are many types of advance directives, and each state has rules about how to use them  You may choose a combination of any of the following:  · Living will: This is a written record of the treatment you want  You can also choose which treatments you do not want, which to limit, and which to stop at a certain time  This includes surgery, medicine, IV fluid, and tube feedings  · Durable power of  for healthcare [facility]): This is a written record that states who you want to make healthcare choices for you when you are unable to make them for yourself  This person, called a proxy, is usually a family member or a friend  You may choose more than 1 proxy  · Do not resuscitate (DNR) order:  A DNR order is used in case your heart stops beating or you stop breathing  It is a request not to have certain forms of treatment, such as CPR  A DNR order may be included in other types of advance directives  · Medical directive: This covers the care that you want if you are in a coma, near death, or unable to make decisions for yourself  You can list the treatments you want for each condition  Treatment may include pain medicine, surgery, blood transfusions, dialysis, IV or tube feedings, and a ventilator (breathing machine)  · Values history: This document has questions about your views, beliefs, and how you feel and think about life  This information can help others choose the care that you would choose  Why are advance directives important? An advance directive helps you control your care  Although spoken wishes may be used, it is better to have your wishes written down  Spoken wishes can be misunderstood, or not followed  Treatments may be given even if you do not want them  An advance directive may make it easier for your family to make difficult choices about your care  Fall Prevention    Fall prevention  includes ways to make your home and other areas safer   It also includes ways you can move more carefully to prevent a fall  Health conditions that cause changes in your blood pressure, vision, or muscle strength and coordination may increase your risk for falls  Medicines may also increase your risk for falls if they make you dizzy, weak, or sleepy  Fall prevention tips:   · Stand or sit up slowly  · Use assistive devices as directed  · Wear shoes that fit well and have soles that   · Wear a personal alarm  · Stay active  · Manage your medical conditions  Home Safety Tips:  · Add items to prevent falls in the bathroom  · Keep paths clear  · Install bright lights in your home  · Keep items you use often on shelves within reach  · Paint or place reflective tape on the edges of your stairs  © Copyright [employer] 2018 Information is for End User's use only and may not be sold, redistributed or otherwise used for commercial purposes   All illustrations and images included in CareNotes® are the copyrighted property of [employer]  or [address] [place], [place] [address]

## 2021-06-14 NOTE — PROGRESS NOTES
Assessment and Plan:     Problem List Items Addressed This Visit     None      Visit Diagnoses     Welcome to Medicare preventive visit    -  Primary    Relevant Orders    POCT ECG (Completed)    Encounter for immunization        Relevant Orders    PNEUMOCOCCAL CONJUGATE VACCINE 13-VALENT GREATER THAN 6 MONTHS (Completed)        BMI Counseling: Body mass index is 27 19 kg/m²  The BMI is above normal  Nutrition recommendations include decreasing portion sizes, consuming healthier snacks, limiting drinks that contain sugar, moderation in carbohydrate intake and reducing intake of cholesterol  Exercise recommendations include moderate physical activity 150 minutes/week, exercising 3-5 times per week and strength training exercises  No pharmacotherapy was ordered  Preventive health issues were discussed with patient, and age appropriate screening tests were ordered as noted in patient's After Visit Summary  Personalized health advice and appropriate referrals for health education or preventive services given if needed, as noted in patient's After Visit Summary  History of Present Illness:     Patient presents for Medicare Annual Wellness visit  She fell backwards, tripped on the  door  Injured R knee, received cortisone and hyaluronic injections and has been in PT since January  She is meeting with Ortho and anticipates having surgery in the near future      Patient Care Team:  Gucci Simpson DO as PCP - General  Gucci Simpson DO as PCP - PCP-[facility] Raymond Arzate MD (Orthopedic Surgery)  Eloy Lboo MD (Gastroenterology)  Girish Jones MD (Family Medicine)  [facility] (Obstetrics and Gynecology)  Guanaco Gaytan (Optometry)  Madison Thorne MD (Gastroenterology)     Problem List:     Patient Active Problem List   Diagnosis    Basal cell carcinoma of skin    Breast disorder    Functional diarrhea    GERD (gastroesophageal reflux disease)    Hypercholesterolemia    Insomnia    MDD (major depressive disorder), recurrent episode ([address] )    Macrocytosis    Microscopic colitis    Migraine headache    Postmenopausal    RBBB    Vulvar irritation    Family history of malignant neoplasm of breast    Family history of malignant neoplasm of ovary    Post herpetic neuralgia    Anxiety      Past Medical and Surgical History:     Past Medical History:   Diagnosis Date    C  difficile diarrhea     resolved 06 jun 2017    Depression     Elevated CEA     resolved 06 sep 2017    GERD (gastroesophageal reflux disease)     Headache(784 0)     Reactive airway disease     RESOLVED 06 NOV 2017    Rotator cuff tear     Shingles     Sleep disorder     RESOLVED 06 NOV 2017    Urinary tract infection      Past Surgical History:   Procedure Laterality Date    BREAST BIOPSY      COLONOSCOPY      DILATION AND CURETTAGE OF UTERUS      FRACTURE SURGERY      ROTATOR CUFF REPAIR      TUBAL LIGATION      WRIST FRACTURE SURGERY        Family History:     Family History   Problem Relation Age of Onset    Heart disease Mother         CARDIAC DISORDER    Hearing loss Mother     Heart disease Father         CARDIAC DISORDER    Heart failure Father         CONGESTIVE HEART FAILURE    Stroke Father     Alcohol abuse Brother     Drug abuse Brother     Substance Abuse Brother       Social History:     Social History     Socioeconomic History    Marital status: /Civil Union     Spouse name: None    Number of children: None    Years of education: None    Highest education level: None   Occupational History    Occupation: SUPERTENDANT OF SCHOOLS     Comment: RETIRED   Tobacco Use    Smoking status: Never Smoker    Smokeless tobacco: Never Used   Substance and Sexual Activity    Alcohol use:  Yes     Alcohol/week: 7 0 standard drinks     Types: 7 Glasses of wine per week     Comment: daily  SOCIAL     Drug use: Never    Sexual activity: Never   Other Topics Concern    None   Social History Narrative    EXERCISES REGULARLY     Social Determinants of Health     Financial Resource Strain:     Difficulty of Paying Living Expenses:    Food Insecurity:     Worried About Running Out of Food in the Last Year:     Ran Out of Food in the Last Year:    Transportation Needs:     Lack of Transportation (Medical):  Lack of Transportation (Non-Medical):    Physical Activity:     Days of Exercise per Week:     Minutes of Exercise per Session:    Stress:     Feeling of Stress :    Social Connections:     Frequency of Communication with Friends and Family:     Frequency of Social Gatherings with Friends and Family:     Attends Temple Services:     Active Member of Clubs or Organizations:     Attends Club or Organization Meetings:     Marital Status:    Intimate Partner Violence:     Fear of Current or Ex-Partner:     Emotionally Abused:     Physically Abused:     Sexually Abused:       Medications and Allergies:     Current Outpatient Medications   Medication Sig Dispense Refill    amitriptyline (ELAVIL) 10 mg tablet Take 10 mg by mouth daily      cetirizine (ZyrTEC) 10 mg tablet Take 10 mg by mouth daily      famotidine (PEPCID) 20 mg tablet Take 20 mg by mouth 2 (two) times a day      saccharomyces boulardii (FLORASTOR) 250 mg capsule Take 250 mg by mouth daily       celecoxib (CeleBREX) 200 mg capsule Take 200 mg by mouth daily       No current facility-administered medications for this visit       Allergies   Allergen Reactions    Ciprofloxacin Nausea Only    Flagyl [Metronidazole] Nausea Only    Flexeril [Cyclobenzaprine]     Flu Virus Vaccine      Annotation - [id number]: local rxtn w N/V 9/14    Morphine     Onion - Food Allergy Hives      Immunizations:     Immunization History   Administered Date(s) Administered    INFLUENZA 09/06/2017, 10/03/2020    Influenza Quadrivalent, 6-35 Months IM 09/06/2017    Influenza, recombinant, quadrivalent,injectable, preservative free 09/20/2019    Influenza, seasonal, injectable 11/06/2011, 09/23/2013, 09/23/2014    Pneumococcal Conjugate 13-Valent 06/14/2021    SARS-CoV-2 / COVID-19 mRNA IM (Pfizer-BioNTech) 03/23/2021, 04/03/2021    Tdap 11/17/2015      Health Maintenance:         Topic Date Due    MAMMOGRAM  02/20/2021    Colorectal Cancer Screening  01/12/2027    HIV Screening  Completed    Hepatitis C Screening  Completed     There are no preventive care reminders to display for this patient  Medicare Health Risk Assessment:     /70 (BP Location: Left arm, Patient Position: Sitting)   Pulse 75   Temp 97 6 °F (36 4 °C) (Skin)   Resp 16   Ht 4' 11" (1 499 m)   Wt 61 1 kg (134 lb 9 6 oz)   SpO2 99%   BMI 27 19 kg/m²      Jovany Escobar is here for her Welcome to Medicare visit  Health Risk Assessment:   Patient rates overall health as very good  Patient feels that their physical health rating is slightly better  Patient is satisfied with their life  Eyesight was rated as slightly worse  Hearing was rated as same  Patient feels that their emotional and mental health rating is slightly worse  Patients states they are sometimes angry  Patient states they are always unusually tired/fatigued  Pain experienced in the last 7 days has been some  Patient's pain rating has been 6/10  Patient states that she has experienced no weight loss or gain in last 6 months  R knee pain    Depression Screening:   PHQ-2 Score: 2  PHQ-9 Score: 9      Fall Risk Screening: In the past year, patient has experienced: history of falling in past year    Number of falls: 1  Injured during fall?: Yes    Feels unsteady when standing or walking?: No    Worried about falling?: No      Urinary Incontinence Screening:   Patient has not leaked urine accidently in the last six months  Home Safety:  Patient has trouble with stairs inside or outside of their home   Patient has working smoke alarms and has no working carbon monoxide detector  Home safety hazards include: none  Nutrition:   Current diet is Regular  Medications:   Patient is not currently taking any over-the-counter supplements  Patient is able to manage medications  Activities of Daily Living (ADLs)/Instrumental Activities of Daily Living (IADLs):   Walk and transfer into and out of bed and chair?: Yes  Dress and groom yourself?: Yes    Bathe or shower yourself?: Yes    Feed yourself? Yes  Do your laundry/housekeeping?: Yes  Manage your money, pay your bills and track your expenses?: Yes  Make your own meals?: Yes    Do your own shopping?: Yes    Durable Medical Equipment Suppliers  patient does not know    Previous Hospitalizations:   Any hospitalizations or ED visits within the last 12 months?: No      Advance Care Planning:   Living will: Yes    Durable POA for healthcare:  Yes    Advanced directive: Yes    Advanced directive counseling given: No      Cognitive Screening:   Provider or family/friend/caregiver concerned regarding cognition?: No    PREVENTIVE SCREENINGS      Cardiovascular Screening:    General: Screening Not Indicated and History Lipid Disorder      Diabetes Screening:     General: Screening Current      Colorectal Cancer Screening:     General: Screening Current      Breast Cancer Screening:     General: Screening Current      Cervical Cancer Screening:    General: Screening Not Indicated      Osteoporosis Screening:    General: Screening Current      Abdominal Aortic Aneurysm (AAA) Screening:        General: Screening Not Indicated      Lung Cancer Screening:     General: Screening Not Indicated      Hepatitis C Screening:    General: Screening Current      Preventive Screening Comments: [facility]    Screening, Brief Intervention, and Referral to Treatment (SBIRT)    Screening  Typical number of drinks in a day: 1  Typical number of drinks in a week: 7  Interpretation: Low risk drinking behavior  Single Item Drug Screening:  How often have you used an illegal drug (including marijuana) or a prescription medication for non-medical reasons in the past year? never    Single Item Drug Screen Score: 0  Interpretation: Negative screen for possible drug use disorder    Brief Intervention  Alcohol & drug use screenings were reviewed  No concerns regarding substance use disorder identified  Time Spent  Time spent providing alcohol/substance abuse assessment and intervention services: 1 minutes    Other Counseling Topics:   Car/seat belt/driving safety, skin self-exam, sunscreen and regular weightbearing exercise and calcium and vitamin D intake  Immunizations discussed  Due for prevnar 13  Also consider shingrix  Review of Systems   Constitutional: Positive for activity change (less active), fatigue and unexpected weight change (gradual weight gain)  Negative for appetite change  HENT: Positive for postnasal drip and rhinorrhea  Respiratory: Negative for cough, chest tightness, shortness of breath and wheezing  Cardiovascular: Negative for chest pain, palpitations and leg swelling  Gastrointestinal: Positive for abdominal pain  Negative for constipation, diarrhea, nausea and vomiting  Bloating   Genitourinary: Negative for difficulty urinating  Musculoskeletal: Positive for arthralgias (R knee pain)  Neurological: Positive for headaches  Negative for dizziness and numbness  Psychiatric/Behavioral: Positive for decreased concentration, dysphoric mood and sleep disturbance (chronic)  The patient is nervous/anxious  Physical Exam  Vitals reviewed  Constitutional:       Appearance: Normal appearance  She is overweight  HENT:      Head: Normocephalic  Right Ear: Tympanic membrane, ear canal and external ear normal  There is no impacted cerumen  Left Ear: Tympanic membrane, ear canal and external ear normal  There is no impacted cerumen        Nose: Nose normal  No congestion or rhinorrhea  Mouth/Throat:      Mouth: Mucous membranes are moist       Pharynx: Oropharynx is clear  No oropharyngeal exudate or posterior oropharyngeal erythema  Eyes:      Extraocular Movements: Extraocular movements intact  Conjunctiva/sclera: Conjunctivae normal       Pupils: Pupils are equal, round, and reactive to light  Comments: Wears glasses   Neck:      Thyroid: No thyromegaly or thyroid tenderness  Vascular: No carotid bruit  Cardiovascular:      Rate and Rhythm: Normal rate and regular rhythm  Pulses: Normal pulses  Heart sounds: Normal heart sounds  Pulmonary:      Effort: Pulmonary effort is normal  No respiratory distress  Breath sounds: Normal breath sounds  No wheezing  Chest:      Comments: Breast exam deferred by patient  Abdominal:      General: Abdomen is flat  Bowel sounds are normal  There is no distension  Palpations: Abdomen is soft  Tenderness: There is no abdominal tenderness  Musculoskeletal:      Cervical back: Neck supple  No tenderness  Right knee: Swelling, deformity and erythema present  No ecchymosis  Tenderness present  Right lower leg: No edema  Left lower leg: No edema  Lymphadenopathy:      Cervical: No cervical adenopathy  Skin:     Coloration: Skin is not jaundiced or pale  Neurological:      General: No focal deficit present  Mental Status: She is alert and oriented to person, place, and time  Psychiatric:         Attention and Perception: Attention normal          Mood and Affect: Mood and affect normal          Speech: Speech normal          Behavior: Behavior is cooperative  EKG NSR 67bpm, left atrial abnormality, incomplete RBB, nonspec T wave abnormality    No change compared to 10/31/2019      Orin Brooks DO

## 2021-06-15 ENCOUNTER — TELEPHONE (OUTPATIENT)
Dept: ADMINISTRATIVE | Facility: OTHER | Age: 66
End: 2021-06-15

## 2021-06-15 NOTE — TELEPHONE ENCOUNTER
----- Message from Murtaza Zavala sent at 6/14/2021  2:02 PM EDT -----  Regarding: Care Gap Request  06/14/21 2:02 PM    Hello, our patient attached above has had Mammogram completed/performed  Please assist in updating the patient chart by pulling the Care Everywhere (CE) document  The date of service is 2/25/21       Thank you,  Nirav Barker  [facility]

## 2021-06-17 NOTE — TELEPHONE ENCOUNTER
Upon review of the In Basket request we were able to locate, review, and update the patient chart as requested for Mammogram     Any additional questions or concerns should be emailed to the Practice Liaisons via [email or web address] email, please do not reply via In Basket      Thank you  Nai Parry MA

## 2021-07-12 ENCOUNTER — APPOINTMENT (OUTPATIENT)
Dept: LAB | Facility: CLINIC | Age: 66
End: 2021-07-12
Payer: MEDICARE

## 2021-07-12 DIAGNOSIS — R93.2 ABNORMAL MAGNETIC RESONANCE IMAGING OF LIVER: ICD-10-CM

## 2021-07-12 LAB
ALBUMIN SERPL BCP-MCNC: 4 G/DL (ref 3.5–5)
ALP SERPL-CCNC: 73 U/L (ref 46–116)
ALT SERPL W P-5'-P-CCNC: 40 U/L (ref 12–78)
AST SERPL W P-5'-P-CCNC: 27 U/L (ref 5–45)
BILIRUB DIRECT SERPL-MCNC: 0.11 MG/DL (ref 0–0.2)
BILIRUB SERPL-MCNC: 0.38 MG/DL (ref 0.2–1)
PROT SERPL-MCNC: 7.4 G/DL (ref 6.4–8.2)

## 2021-07-12 PROCEDURE — 36415 COLL VENOUS BLD VENIPUNCTURE: CPT

## 2021-07-12 PROCEDURE — 80076 HEPATIC FUNCTION PANEL: CPT

## 2021-09-13 ENCOUNTER — OFFICE VISIT (OUTPATIENT)
Dept: URGENT CARE | Facility: MEDICAL CENTER | Age: 66
End: 2021-09-13
Payer: MEDICARE

## 2021-09-13 VITALS
OXYGEN SATURATION: 98 % | HEART RATE: 80 BPM | SYSTOLIC BLOOD PRESSURE: 142 MMHG | BODY MASS INDEX: 25.6 KG/M2 | HEIGHT: 59 IN | RESPIRATION RATE: 16 BRPM | WEIGHT: 127 LBS | DIASTOLIC BLOOD PRESSURE: 80 MMHG | TEMPERATURE: 98 F

## 2021-09-13 DIAGNOSIS — L03.90 CELLULITIS, UNSPECIFIED CELLULITIS SITE: Primary | ICD-10-CM

## 2021-09-13 PROCEDURE — G0463 HOSPITAL OUTPT CLINIC VISIT: HCPCS | Performed by: PHYSICIAN ASSISTANT

## 2021-09-13 PROCEDURE — 99213 OFFICE O/P EST LOW 20 MIN: CPT | Performed by: PHYSICIAN ASSISTANT

## 2021-09-13 RX ORDER — HYOSCYAMINE SULFATE 0.125 MG
0.12 TABLET ORAL AS NEEDED
COMMUNITY
Start: 2021-08-16

## 2021-09-13 RX ORDER — CLINDAMYCIN HYDROCHLORIDE 300 MG/1
300 CAPSULE ORAL 4 TIMES DAILY
Qty: 40 CAPSULE | Refills: 0 | Status: SHIPPED | OUTPATIENT
Start: 2021-09-13 | End: 2021-09-23

## 2021-09-13 RX ORDER — RAMELTEON 8 MG/1
TABLET ORAL
COMMUNITY
End: 2022-07-19

## 2021-09-13 RX ORDER — AMITRIPTYLINE HYDROCHLORIDE 100 MG/1
TABLET, FILM COATED ORAL
COMMUNITY
End: 2022-01-04

## 2021-09-13 RX ORDER — AMITRIPTYLINE HYDROCHLORIDE 50 MG/1
TABLET, FILM COATED ORAL
COMMUNITY
End: 2022-07-19

## 2021-09-13 RX ORDER — HYALURONATE SODIUM 20 MG/2 ML
2 SYRINGE (ML) INTRAARTICULAR
COMMUNITY
End: 2022-07-19

## 2021-09-13 RX ORDER — AMITRIPTYLINE HYDROCHLORIDE 25 MG/1
TABLET, FILM COATED ORAL
COMMUNITY
End: 2022-01-04

## 2021-09-13 RX ORDER — FAMOTIDINE 40 MG/1
TABLET, FILM COATED ORAL
COMMUNITY
End: 2022-01-04

## 2021-09-13 RX ORDER — FAMOTIDINE 40 MG/1
40 TABLET, FILM COATED ORAL 2 TIMES DAILY
COMMUNITY
Start: 2021-07-01

## 2021-09-13 NOTE — PATIENT INSTRUCTIONS
Cellulitis   WHAT YOU NEED TO KNOW:   Cellulitis is a skin infection caused by bacteria  Cellulitis is common and can become severe  Cellulitis usually appears on the lower legs  It can also appear on the arms, face, and other areas  Cellulitis develops when bacteria enter a crack or break in your skin, such as a scratch, bite, or cut  DISCHARGE INSTRUCTIONS:   Return to the emergency department if:   · Your wound gets larger and more painful  · You feel a crackling under your skin when you touch it  · You have purple dots or bumps on your skin, or you see bleeding under your skin  · You see red streaks coming from the infected area  Call your doctor if:   · The red, warm, swollen area gets larger  · Your fever or pain does not go away or gets worse  · The area does not get smaller after 3 days of antibiotics  · You have questions or concerns about your condition or care  Medicines: You should start to see improvement in 3 days  If cellulitis is not treated, the infection can spread through your body and become life-threatening  You may need any of the following medicines:  · Antibiotics  help treat the bacterial infection  · Acetaminophen  decreases pain and fever  It is available without a doctor's order  Ask how much to take and how often to take it  Follow directions  Read the labels of all other medicines you are using to see if they also contain acetaminophen, or ask your doctor or pharmacist  Acetaminophen can cause liver damage if not taken correctly  Do not use more than 4 grams (4,000 milligrams) total of acetaminophen in one day  · NSAIDs , such as ibuprofen, help decrease swelling, pain, and fever  This medicine is available with or without a doctor's order  NSAIDs can cause stomach bleeding or kidney problems in certain people  If you take blood thinner medicine, always ask your healthcare provider if NSAIDs are safe for you   Always read the medicine label and follow directions  · Take your medicine as directed  Contact your healthcare provider if you think your medicine is not helping or if you have side effects  Tell him or her if you are allergic to any medicine  Keep a list of the medicines, vitamins, and herbs you take  Include the amounts, and when and why you take them  Bring the list or the pill bottles to follow-up visits  Carry your medicine list with you in case of an emergency  Self-care:   · Wash the area with soap and water every day  Gently pat dry  Use bandages if directed by your healthcare provider  · Elevate the area above the level of your heart  as often as you can  This will help decrease swelling and pain  Prop the area on pillows or blankets to keep it elevated comfortably  · Place a cool, damp cloth on the area  Use clean cloths and clean water  You can do this as often as you need to  Cool, damp cloths may help decrease pain  · Apply cream or ointment as directed  These help protect the area  Most over-the-counter products, such as petroleum jelly, are good to use  Ask your healthcare provider about specific creams or ointments you should use  Prevent cellulitis:   · Do not scratch bug bites or areas of injury  You increase your risk for cellulitis by scratching these areas  · Do not share personal items, such as towels, clothing, and razors  · Clean exercise equipment  with germ-killing  before and after you use it  · Treat athlete's foot  This can help prevent the spread of a bacterial skin infection  · Wash your hands often  Use soap and water  Wash your hands after you use the bathroom, change a child's diapers, or sneeze  Wash your hands before you prepare or eat food  Use lotion to prevent dry, cracked skin  Follow up with your doctor within 3 days, or as directed:  He or she will check if your cellulitis is getting better   Write down your questions so you remember to ask them during your visits  © Copyright [address] 2021 Information is for End User's use only and may not be sold, redistributed or otherwise used for commercial purposes  All illustrations and images included in CareNotes® are the copyrighted property of [employer]  or Zoë Aggarwal  The above information is an  only  It is not intended as medical advice for individual conditions or treatments  Talk to your doctor, nurse or pharmacist before following any medical regimen to see if it is safe and effective for you

## 2021-09-13 NOTE — PROGRESS NOTES
[facility]        NAME: Kasandra Adams is a 73 y o  female  : 1955    MRN: [id number]  DATE: 2021  TIME: 10:23 PM    /80   Pulse 80   Temp 98 °F (36 7 °C)   Resp 16   Ht 4' 11" (1 499 m)   Wt 57 6 kg (127 lb)   SpO2 98%   BMI 25 65 kg/m²     Assessment and Plan   Cellulitis, unspecified cellulitis site [L03 90]  1  Cellulitis, unspecified cellulitis site  clindamycin (CLEOCIN) 300 MG capsule         Patient Instructions       Follow up with PCP in 3-5 days  Proceed to  ER if symptoms worsen  Chief Complaint     Chief Complaint   Patient presents with    Cellulitis     Possible jellyfish sting on right thigh x 5 days  History of Present Illness       Pt with jelly fish sting to right thigh x 5 days, pt with pain and itching to area still       Review of Systems   Review of Systems   Constitutional: Negative  HENT: Negative  Eyes: Negative  Respiratory: Negative  Cardiovascular: Negative  Gastrointestinal: Negative  Endocrine: Negative  Genitourinary: Negative  Musculoskeletal: Negative  Skin: Positive for rash  Allergic/Immunologic: Negative  Neurological: Negative  Hematological: Negative  Psychiatric/Behavioral: Negative  All other systems reviewed and are negative          Current Medications       Current Outpatient Medications:     hyoscyamine (ANASPAZ,LEVSIN) 0 125 MG tablet, Take 0 125 mg by mouth, Disp: , Rfl:     amitriptyline (ELAVIL) 10 mg tablet, Take 10 mg by mouth daily, Disp: , Rfl:     amitriptyline (ELAVIL) 100 mg tablet, amitriptyline 100 mg tablet, Disp: , Rfl:     amitriptyline (ELAVIL) 25 mg tablet, amitriptyline 25 mg tablet, Disp: , Rfl:     amitriptyline (ELAVIL) 50 mg tablet, Elavil 50 mg tablet  Take 1 tablet every day by oral route , Disp: , Rfl:     celecoxib (CeleBREX) 200 mg capsule, Take 200 mg by mouth daily, Disp: , Rfl:     cetirizine (ZyrTEC) 10 mg tablet, Take 10 mg by mouth daily, Disp: , Rfl:     clindamycin (CLEOCIN) 300 MG capsule, Take 1 capsule (300 mg total) by mouth 4 (four) times a day for 10 days, Disp: 40 capsule, Rfl: 0    famotidine (PEPCID) 20 mg tablet, Take 20 mg by mouth 2 (two) times a day, Disp: , Rfl:     famotidine (PEPCID) 40 MG tablet, famotidine 40 mg tablet  TAKE 1 TABLET BY MOUTH TWO TIMES DAILY, Disp: , Rfl:     famotidine (PEPCID) 40 MG tablet, Take 40 mg by mouth 2 (two) times a day, Disp: , Rfl:     ramelteon (ROZEREM) 8 mg tablet, Rozerem 8 mg tablet, Disp: , Rfl:     saccharomyces boulardii (FLORASTOR) 250 mg capsule, Take 250 mg by mouth daily , Disp: , Rfl:     Sodium Hyaluronate (Euflexxa) 20 MG/2ML SOSY, 2 mL, Disp: , Rfl:     Current Allergies     Allergies as of 09/13/2021 - Reviewed 09/13/2021   Allergen Reaction Noted    Cephalexin GI Intolerance and Other (See Comments) 12/21/2020    Ciprofloxacin Nausea Only 02/01/2018    Flagyl [metronidazole] Nausea Only 02/01/2018    Flexeril [cyclobenzaprine]  03/18/2017    Flu virus vaccine  09/25/2014    Morphine  03/18/2017    Onion - food allergy Hives 01/22/2015            The following portions of the patient's history were reviewed and updated as appropriate: allergies, current medications, past family history, past medical history, past social history, past surgical history and problem list      Past Medical History:   Diagnosis Date    C  difficile diarrhea     resolved 06 jun 2017    Depression     Elevated CEA     resolved 06 sep 2017    GERD (gastroesophageal reflux disease)     Headache(784 0)     Reactive airway disease     RESOLVED 06 NOV 2017    Rotator cuff tear     Shingles     Sleep disorder     RESOLVED 06 NOV 2017    Urinary tract infection        Past Surgical History:   Procedure Laterality Date    BREAST BIOPSY      COLONOSCOPY      DILATION AND CURETTAGE OF UTERUS      FRACTURE SURGERY      ROTATOR CUFF REPAIR      TUBAL LIGATION      WRIST FRACTURE SURGERY         Family History   Problem Relation Age of Onset    Heart disease Mother         CARDIAC DISORDER    Hearing loss Mother     Heart disease Father         CARDIAC DISORDER    Heart failure Father         CONGESTIVE HEART FAILURE    Stroke Father     Alcohol abuse Brother     Drug abuse Brother     Substance Abuse Brother          Medications have been verified  Objective   /80   Pulse 80   Temp 98 °F (36 7 °C)   Resp 16   Ht 4' 11" (1 499 m)   Wt 57 6 kg (127 lb)   SpO2 98%   BMI 25 65 kg/m²        Physical Exam     Physical Exam  Vitals and nursing note reviewed  Constitutional:       Appearance: Normal appearance  She is normal weight  HENT:      Head: Normocephalic and atraumatic  Right Ear: Tympanic membrane, ear canal and external ear normal       Left Ear: Tympanic membrane, ear canal and external ear normal       Nose: Nose normal       Mouth/Throat:      Mouth: Mucous membranes are moist       Pharynx: Oropharynx is clear  Eyes:      Extraocular Movements: Extraocular movements intact  Conjunctiva/sclera: Conjunctivae normal       Pupils: Pupils are equal, round, and reactive to light  Cardiovascular:      Rate and Rhythm: Normal rate and regular rhythm  Pulses: Normal pulses  Heart sounds: Normal heart sounds  Pulmonary:      Effort: Pulmonary effort is normal       Breath sounds: Normal breath sounds  Abdominal:      General: Abdomen is flat  Bowel sounds are normal       Palpations: Abdomen is soft  Musculoskeletal:         General: Normal range of motion  Cervical back: Normal range of motion and neck supple  Skin:     General: Skin is warm  Capillary Refill: Capillary refill takes less than 2 seconds  Comments: Right medial thigh swelling erythema and tenderness   Neurological:      General: No focal deficit present  Mental Status: She is alert and oriented to person, place, and time     Psychiatric: Mood and Affect: Mood normal          Behavior: Behavior normal

## 2021-12-10 ENCOUNTER — RA CDI HCC (OUTPATIENT)
Dept: OTHER | Facility: HOSPITAL | Age: 66
End: 2021-12-10

## 2022-01-04 ENCOUNTER — OFFICE VISIT (OUTPATIENT)
Dept: INTERNAL MEDICINE CLINIC | Facility: CLINIC | Age: 67
End: 2022-01-04
Payer: MEDICARE

## 2022-01-04 VITALS
DIASTOLIC BLOOD PRESSURE: 80 MMHG | BODY MASS INDEX: 25.8 KG/M2 | OXYGEN SATURATION: 99 % | WEIGHT: 128 LBS | HEIGHT: 59 IN | SYSTOLIC BLOOD PRESSURE: 122 MMHG | HEART RATE: 74 BPM | RESPIRATION RATE: 16 BRPM | TEMPERATURE: 96.9 F

## 2022-01-04 DIAGNOSIS — L98.9 SCALP LESION: Primary | ICD-10-CM

## 2022-01-04 DIAGNOSIS — E78.00 HYPERCHOLESTEROLEMIA: ICD-10-CM

## 2022-01-04 DIAGNOSIS — K21.9 GASTROESOPHAGEAL REFLUX DISEASE WITHOUT ESOPHAGITIS: ICD-10-CM

## 2022-01-04 DIAGNOSIS — K59.1 FUNCTIONAL DIARRHEA: ICD-10-CM

## 2022-01-04 DIAGNOSIS — F51.04 PSYCHOPHYSIOLOGICAL INSOMNIA: ICD-10-CM

## 2022-01-04 DIAGNOSIS — G43.909 MIGRAINE WITHOUT STATUS MIGRAINOSUS, NOT INTRACTABLE, UNSPECIFIED MIGRAINE TYPE: ICD-10-CM

## 2022-01-04 PROBLEM — B02.29 POST HERPETIC NEURALGIA: Status: RESOLVED | Noted: 2018-09-18 | Resolved: 2022-01-04

## 2022-01-04 PROCEDURE — 99214 OFFICE O/P EST MOD 30 MIN: CPT | Performed by: INTERNAL MEDICINE

## 2022-01-04 NOTE — ASSESSMENT & PLAN NOTE
-difficulty maintaining sleep  -triggered by anxiety  -failed melatonin, trazodone, temazepam  -now on amitriptyline    Monitor

## 2022-01-04 NOTE — ASSESSMENT & PLAN NOTE
-IBS-D, triggered by anxiety  -reduced symptoms with amitriptyline 50mg daily with hyoscyamine for acute  -followed by GI at [facility]

## 2022-01-04 NOTE — PROGRESS NOTES
Assessment/Plan:    Problem List Items Addressed This Visit        Digestive    Functional diarrhea     -IBS-D, triggered by anxiety  -reduced symptoms with amitriptyline 50mg daily with hyoscyamine for acute  -followed by GI at [facility]         Relevant Orders    Comprehensive metabolic panel    CBC    GERD (gastroesophageal reflux disease)     -stable, takes famotidine  -followed by GI at [facility]            Cardiovascular and Mediastinum    Migraine headache     -triggered by anxiety  -recommend to continue nonpharmacologic therapies         Relevant Orders    Comprehensive metabolic panel    CBC       Other    Hypercholesterolemia    Relevant Orders    Lipid panel    Comprehensive metabolic panel    Insomnia     -difficulty maintaining sleep  -triggered by anxiety  -failed melatonin, trazodone, temazepam  -now on amitriptyline  Monitor           Other Visit Diagnoses     Scalp lesion    -  Primary    -refer to plastics for further evaluatoin    Relevant Orders    Ambulatory referral to Plastic Surgery          Subjective:      Patient ID: Saul Helton is a 73 y o  female  HPI  73yo female with HLD, MDD, lumbar spondylosis, insomnia, IBS-D, microscopic colitis and h/o recurrent C dif colitis here for follow up care  In the interim, she had R knee surgery with [facility]  Her mother has fallen twice, once in Sept and had injuries  She is now living in independent living  She has not been sleeping because of it despite elavil 50mg but was prescribed for IBS but still has abdominal pain but is much reduced than before, gets flares with incidents  Sometimes she wakes with spasms and takes hyoscyamine  Migraines have not been great lately  She started taking zyrtec again because she felt allergies have worsened  It has helped  When things are worse, applies ice  She reports she had lumps removed on her scalp  There seems to be growing again  No new shampoos, itchiness, drainage      The following portions of the patient's history were reviewed and updated as appropriate: allergies, current medications, past family history, past medical history, past social history, past surgical history and problem list       Current Outpatient Medications:     amitriptyline (ELAVIL) 50 mg tablet, Elavil 50 mg tablet  Take 1 tablet every day by oral route , Disp: , Rfl:     cetirizine (ZyrTEC) 10 mg tablet, Take 10 mg by mouth daily, Disp: , Rfl:     famotidine (PEPCID) 40 MG tablet, Take 40 mg by mouth 2 (two) times a day, Disp: , Rfl:     hyoscyamine (ANASPAZ,LEVSIN) 0 125 MG tablet, Take 0 125 mg by mouth, Disp: , Rfl:     ramelteon (ROZEREM) 8 mg tablet, Rozerem 8 mg tablet, Disp: , Rfl:     Sodium Hyaluronate (Euflexxa) 20 MG/2ML SOSY, 2 mL, Disp: , Rfl:     Review of Systems   Constitutional: Positive for unexpected weight change  HENT: Positive for postnasal drip and rhinorrhea  Respiratory: Negative for cough, chest tightness, shortness of breath and wheezing  Cardiovascular: Negative for chest pain, palpitations and leg swelling  Gastrointestinal: Positive for abdominal pain and constipation  Genitourinary: Negative for difficulty urinating  Musculoskeletal: Positive for arthralgias  Skin: Negative for color change, rash and wound  Scalp lesion   Neurological: Positive for headaches  Negative for dizziness  Psychiatric/Behavioral: Positive for decreased concentration, dysphoric mood and sleep disturbance  The patient is nervous/anxious  Objective:    /80 (BP Location: Left arm, Patient Position: Sitting)   Pulse 74   Temp (!) 96 9 °F (36 1 °C)   Resp 16   Ht 4' 11" (1 499 m)   Wt 58 1 kg (128 lb)   SpO2 99%   Breastfeeding Yes   BMI 25 85 kg/m²      Physical Exam  Vitals reviewed  Constitutional:       General: She is not in acute distress  Appearance: She is not diaphoretic  Cardiovascular:      Rate and Rhythm: Normal rate and regular rhythm  Pulses: Normal pulses  Heart sounds: Normal heart sounds  Pulmonary:      Effort: Pulmonary effort is normal  No respiratory distress  Breath sounds: Normal breath sounds  No wheezing  Abdominal:      General: Bowel sounds are normal  There is no distension  Palpations: Abdomen is soft  Tenderness: There is no abdominal tenderness  Musculoskeletal:      Cervical back: Neck supple  Right lower leg: No edema  Left lower leg: No edema  Skin:     Coloration: Skin is not pale  Comments: approx 1cm protruding cystic lesion on R scalp at hairline and at top of head, nontender, nonerythematous   Neurological:      Mental Status: She is alert and oriented to person, place, and time  Psychiatric:         Attention and Perception: Attention normal          Mood and Affect: Mood is depressed  Speech: Speech normal          Behavior: Behavior is cooperative

## 2022-03-14 ENCOUNTER — TELEPHONE (OUTPATIENT)
Dept: INTERNAL MEDICINE CLINIC | Facility: CLINIC | Age: 67
End: 2022-03-14

## 2022-03-14 NOTE — TELEPHONE ENCOUNTER
I called patient and left a message to clarify her symptoms and to see if she needs to get tested for covid before her appointment    Thank you

## 2022-03-18 ENCOUNTER — APPOINTMENT (OUTPATIENT)
Dept: LAB | Facility: CLINIC | Age: 67
End: 2022-03-18
Payer: MEDICARE

## 2022-03-18 DIAGNOSIS — E78.00 HYPERCHOLESTEROLEMIA: ICD-10-CM

## 2022-03-18 DIAGNOSIS — K59.1 FUNCTIONAL DIARRHEA: ICD-10-CM

## 2022-03-18 DIAGNOSIS — G43.909 MIGRAINE WITHOUT STATUS MIGRAINOSUS, NOT INTRACTABLE, UNSPECIFIED MIGRAINE TYPE: ICD-10-CM

## 2022-03-18 LAB
ALBUMIN SERPL BCP-MCNC: 3.4 G/DL (ref 3.5–5)
ALP SERPL-CCNC: 118 U/L (ref 46–116)
ALT SERPL W P-5'-P-CCNC: 61 U/L (ref 12–78)
ANION GAP SERPL CALCULATED.3IONS-SCNC: 8 MMOL/L (ref 4–13)
AST SERPL W P-5'-P-CCNC: 39 U/L (ref 5–45)
BILIRUB SERPL-MCNC: 0.33 MG/DL (ref 0.2–1)
BUN SERPL-MCNC: 19 MG/DL (ref 5–25)
CALCIUM ALBUM COR SERPL-MCNC: 9.7 MG/DL (ref 8.3–10.1)
CALCIUM SERPL-MCNC: 9.2 MG/DL (ref 8.3–10.1)
CHLORIDE SERPL-SCNC: 105 MMOL/L (ref 100–108)
CHOLEST SERPL-MCNC: 229 MG/DL
CO2 SERPL-SCNC: 27 MMOL/L (ref 21–32)
CREAT SERPL-MCNC: 0.86 MG/DL (ref 0.6–1.3)
ERYTHROCYTE [DISTWIDTH] IN BLOOD BY AUTOMATED COUNT: 13.4 % (ref 11.6–15.1)
GFR SERPL CREATININE-BSD FRML MDRD: 71 ML/MIN/1.73SQ M
GLUCOSE P FAST SERPL-MCNC: 85 MG/DL (ref 65–99)
HCT VFR BLD AUTO: 33.4 % (ref 34.8–46.1)
HDLC SERPL-MCNC: 63 MG/DL
HGB BLD-MCNC: 11.7 G/DL (ref 11.5–15.4)
LDLC SERPL CALC-MCNC: 144 MG/DL (ref 0–100)
MCH RBC QN AUTO: 36.7 PG (ref 26.8–34.3)
MCHC RBC AUTO-ENTMCNC: 35 G/DL (ref 31.4–37.4)
MCV RBC AUTO: 105 FL (ref 82–98)
NONHDLC SERPL-MCNC: 166 MG/DL
PLATELET # BLD AUTO: 449 THOUSANDS/UL (ref 149–390)
PMV BLD AUTO: 9.9 FL (ref 8.9–12.7)
POTASSIUM SERPL-SCNC: 4.2 MMOL/L (ref 3.5–5.3)
PROT SERPL-MCNC: 7.2 G/DL (ref 6.4–8.2)
RBC # BLD AUTO: 3.19 MILLION/UL (ref 3.81–5.12)
SODIUM SERPL-SCNC: 140 MMOL/L (ref 136–145)
TRIGL SERPL-MCNC: 109 MG/DL
WBC # BLD AUTO: 4.57 THOUSAND/UL (ref 4.31–10.16)

## 2022-03-18 PROCEDURE — 80061 LIPID PANEL: CPT

## 2022-03-18 PROCEDURE — 36415 COLL VENOUS BLD VENIPUNCTURE: CPT

## 2022-03-18 PROCEDURE — 85027 COMPLETE CBC AUTOMATED: CPT

## 2022-03-18 PROCEDURE — 80053 COMPREHEN METABOLIC PANEL: CPT

## 2022-03-21 ENCOUNTER — PATIENT MESSAGE (OUTPATIENT)
Dept: INTERNAL MEDICINE CLINIC | Facility: CLINIC | Age: 67
End: 2022-03-21

## 2022-03-21 DIAGNOSIS — R74.8 ELEVATED ALKALINE PHOSPHATASE LEVEL: ICD-10-CM

## 2022-03-21 DIAGNOSIS — D75.839 THROMBOCYTOSIS: Primary | ICD-10-CM

## 2022-07-15 ENCOUNTER — TELEPHONE (OUTPATIENT)
Dept: ADMINISTRATIVE | Facility: OTHER | Age: 67
End: 2022-07-15

## 2022-07-15 NOTE — TELEPHONE ENCOUNTER
Upon review of the In Basket request we were able to locate, review, and update the patient chart as requested for Mammogram and Pap Smear (HPV) aka Cervical Cancer Screening  Any additional questions or concerns should be emailed to the Practice Liaisons via [email or web address] email, please do not reply via In Basket      Thank you  Davy New MA

## 2022-07-15 NOTE — TELEPHONE ENCOUNTER
----- Message from So Duque sent at 7/14/2022  2:05 PM EDT -----  Regarding: care gap request - mammo  07/14/22 2:05 PM    Hello, our patient attached above has had Mammogram completed/performed  Please assist in updating the patient chart by pulling the Care Everywhere (CE) document  The date of service is 04/15/2021       Thank you,  [facility]

## 2022-07-15 NOTE — TELEPHONE ENCOUNTER
----- Message from Barrington Arriaza sent at 7/14/2022  2:03 PM EDT -----  Regarding: care gap request - mammo  07/14/22 2:03 PM    Hello, our patient attached above has had Mammogram completed/performed  Please assist in updating the patient chart by pulling the Care Everywhere (CE) document  The date of service is 02/28/2022       Thank you,  [facility]

## 2022-07-19 ENCOUNTER — OFFICE VISIT (OUTPATIENT)
Dept: FAMILY MEDICINE CLINIC | Facility: CLINIC | Age: 67
End: 2022-07-19
Payer: MEDICARE

## 2022-07-19 VITALS
RESPIRATION RATE: 16 BRPM | TEMPERATURE: 96.7 F | BODY MASS INDEX: 26 KG/M2 | HEIGHT: 59 IN | DIASTOLIC BLOOD PRESSURE: 78 MMHG | SYSTOLIC BLOOD PRESSURE: 128 MMHG | HEART RATE: 74 BPM | OXYGEN SATURATION: 96 % | WEIGHT: 129 LBS

## 2022-07-19 DIAGNOSIS — K58.0 IRRITABLE BOWEL SYNDROME WITH DIARRHEA: ICD-10-CM

## 2022-07-19 DIAGNOSIS — E53.8 VITAMIN B12 DEFICIENCY: ICD-10-CM

## 2022-07-19 DIAGNOSIS — Z00.00 ENCOUNTER FOR ANNUAL WELLNESS EXAM IN MEDICARE PATIENT: Primary | ICD-10-CM

## 2022-07-19 DIAGNOSIS — K59.1 FUNCTIONAL DIARRHEA: ICD-10-CM

## 2022-07-19 DIAGNOSIS — F51.04 PSYCHOPHYSIOLOGICAL INSOMNIA: ICD-10-CM

## 2022-07-19 DIAGNOSIS — D75.89 MACROCYTOSIS: ICD-10-CM

## 2022-07-19 DIAGNOSIS — E55.9 VITAMIN D DEFICIENCY: ICD-10-CM

## 2022-07-19 DIAGNOSIS — F41.1 GENERALIZED ANXIETY DISORDER: ICD-10-CM

## 2022-07-19 PROCEDURE — G0439 PPPS, SUBSEQ VISIT: HCPCS | Performed by: FAMILY MEDICINE

## 2022-07-19 PROCEDURE — 1123F ACP DISCUSS/DSCN MKR DOCD: CPT | Performed by: FAMILY MEDICINE

## 2022-07-19 RX ORDER — AMITRIPTYLINE HYDROCHLORIDE 10 MG/1
20 TABLET, FILM COATED ORAL
Qty: 180 TABLET | Refills: 1 | Status: SHIPPED | OUTPATIENT
Start: 2022-07-19

## 2022-07-19 RX ORDER — AMITRIPTYLINE HYDROCHLORIDE 10 MG/1
20 TABLET, FILM COATED ORAL
COMMUNITY
Start: 2022-04-25 | End: 2022-07-19 | Stop reason: SDUPTHER

## 2022-07-19 RX ORDER — ONDANSETRON 4 MG/1
TABLET, ORALLY DISINTEGRATING ORAL
COMMUNITY
Start: 2022-07-15

## 2022-07-19 NOTE — PROGRESS NOTES
Assessment and Plan: Carmen Matthew was seen today for new patient visit and medicare wellness visit  Diagnoses and all orders for this visit:    Encounter for annual wellness exam in Medicare patient    Psychophysiological insomnia  -     amitriptyline (ELAVIL) 10 mg tablet; Take 2 tablets (20 mg total) by mouth daily at bedtime    Irritable bowel syndrome with diarrhea  -     TSH, 3rd generation; Future  -     Comprehensive metabolic panel; Future  -     CBC and Platelet; Future    Macrocytosis  -     TSH, 3rd generation; Future  -     Comprehensive metabolic panel; Future  -     CBC and Platelet; Future    Functional diarrhea  -     TSH, 3rd generation; Future  -     CBC and Platelet; Future    Vitamin D deficiency  -     Vitamin D 25 hydroxy; Future    Vitamin B12 deficiency  -     Vitamin B12; Future    Generalized anxiety disorder   -     TSH, 3rd generation; Future      Problem List Items Addressed This Visit        Digestive    Functional diarrhea    Relevant Orders    TSH, 3rd generation    CBC and Platelet    Irritable bowel syndrome with diarrhea    Relevant Orders    TSH, 3rd generation    Comprehensive metabolic panel    CBC and Platelet       Other    Insomnia    Relevant Medications    amitriptyline (ELAVIL) 10 mg tablet    Macrocytosis    Relevant Orders    TSH, 3rd generation    Comprehensive metabolic panel    CBC and Platelet      Other Visit Diagnoses     Encounter for annual wellness exam in Medicare patient    -  Primary    Vitamin D deficiency        Relevant Orders    Vitamin D 25 hydroxy    Vitamin B12 deficiency        Relevant Orders    Vitamin B12    Generalized anxiety disorder         Relevant Medications    amitriptyline (ELAVIL) 10 mg tablet    Other Relevant Orders    TSH, 3rd generation        BMI Counseling: Body mass index is 26 05 kg/m²   The BMI is above normal  Nutrition recommendations include decreasing portion sizes, encouraging healthy choices of fruits and vegetables, decreasing fast food intake, consuming healthier snacks, limiting drinks that contain sugar, moderation in carbohydrate intake, increasing intake of lean protein, reducing intake of saturated and trans fat and reducing intake of cholesterol  Exercise recommendations include exercising 3-5 times per week  Patient referred to PCP  Rationale for BMI follow-up plan is due to patient being overweight or obese  Preventive health issues were discussed with patient, and age appropriate screening tests were ordered as noted in patient's After Visit Summary  Personalized health advice and appropriate referrals for health education or preventive services given if needed, as noted in patient's After Visit Summary  History of Present Illness:     Patient presents for a Medicare Wellness Visit    New patient here to establish care  Medicare  Office note started as [facility]  Depression screen positive  it appears from the records that she did see Dr Donato Santos back in 2015  Currently Dr Yolanda De Dios is listed as her PCP  (internal [facility])  Records indicate she sees Gastroenterology out of [address]  Thin prep Pap completed April 2021 with [facility] gynecologist    Looks like colonoscopy was done in January 2017    IBS with constipation and diarrhea  Her issues are more anxiety rather than depression, does not endorse any suicidal thoughts or ideations  Patient Care Team:  Trista Michelle DO as PCP - General (Family Medicine)  Aurora Ham DO as PCP - [facility]  Judie Kauffman MD (Orthopedic Surgery)  Tamia Huerta MD (Family Medicine)  [facility] (Obstetrics and Gynecology)  Claire Villalobos OD (Optometry)     Review of Systems:     Review of Systems   Gastrointestinal: Positive for abdominal pain (epigastric)  Constipation: food triggers, afraid to eat  Diarrhea: fluctuautes          GI with stress---   Genitourinary: Spasm is GI lower   Psychiatric/Behavioral: Sleep disturbance: Secondary to stress issues  Stressors caring for elderly mother    Placement and selling her house etc     Does bland diet, tries to add    Pt is  seeing a therapist via  a therapist (via telemedicine)   Problem List:     Patient Active Problem List   Diagnosis    Basal cell carcinoma of skin    Breast disorder    Functional diarrhea    GERD (gastroesophageal reflux disease)    Hypercholesterolemia    Insomnia    MDD (major depressive disorder), recurrent episode ([address] )    Macrocytosis    Microscopic colitis    Migraine headache    Postmenopausal    RBBB    Vulvar irritation    Family history of malignant neoplasm of breast    Family history of malignant neoplasm of ovary    Anxiety    Irritable bowel syndrome with diarrhea      Past Medical and Surgical History:     Past Medical History:   Diagnosis Date    C  difficile diarrhea     resolved 06 jun 2017    Depression     Elevated CEA     resolved 06 sep 2017    GERD (gastroesophageal reflux disease)     Headache(784 0)     Post herpetic neuralgia 9/18/2018    Reactive airway disease     RESOLVED 06 NOV 2017    Rotator cuff tear     Shingles     Sleep disorder     RESOLVED 06 NOV 2017    Urinary tract infection      Past Surgical History:   Procedure Laterality Date    BREAST BIOPSY      COLONOSCOPY      CYST REMOVAL  2022    2 cysts on head removed and biopsied    DILATION AND CURETTAGE OF UTERUS      FRACTURE SURGERY      ROTATOR CUFF REPAIR      TUBAL LIGATION      WRIST FRACTURE SURGERY        Family History:     Family History   Problem Relation Age of Onset    Hyperlipidemia Mother     Hypertension Mother     Heart disease Mother         CARDIAC DISORDER    Hearing loss Mother     Heart disease Father         CARDIAC DISORDER    Heart failure Father         CONGESTIVE HEART FAILURE    Stroke Father     Alcohol abuse Brother     Drug abuse Brother     Substance Abuse Brother       Social History:     Social History     Socioeconomic History    Marital status: /Civil Union     Spouse name: None    Number of children: None    Years of education: None    Highest education level: None   Occupational History    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   Tobacco Use    Smoking status: Never Smoker    Smokeless tobacco: Never Used   Vaping Use    Vaping Use: Never used   Substance and Sexual Activity    Alcohol use: Yes     Alcohol/week: 7 0 standard drinks     Types: 7 Glasses of wine per week     Comment: daily  SOCIAL     Drug use: Never    Sexual activity: Not Currently     Partners: Male   Other Topics Concern    None   Social History Narrative    EXERCISES REGULARLY     Social Determinants of Health     Financial Resource Strain: Not on file   Food Insecurity: Not on file   Transportation Needs: Not on file   Physical Activity: Not on file   Stress: Not on file   Social Connections: Not on file   Intimate Partner Violence: Not on file   Housing Stability: Not on file      Medications and Allergies:     Current Outpatient Medications   Medication Sig Dispense Refill    amitriptyline (ELAVIL) 10 mg tablet Take 2 tablets (20 mg total) by mouth daily at bedtime 180 tablet 1    cetirizine (ZyrTEC) 10 mg tablet Take 10 mg by mouth daily      famotidine (PEPCID) 40 MG tablet Take 40 mg by mouth 2 (two) times a day      hyoscyamine (ANASPAZ,LEVSIN) 0 125 MG tablet Take 0 125 mg by mouth if needed      ondansetron (ZOFRAN-ODT) 4 mg disintegrating tablet DISSOLVE 1 TABLET ON TOP OF TONGUE EVERY 8 HOURS       No current facility-administered medications for this visit       Allergies   Allergen Reactions    Cephalexin GI Intolerance and Other (See Comments)     reports potential for cdiff which she had in the past and does not wish to take anything  that can potentially cause again    Sulfamethoxazole-Trimethoprim Other (See Comments)     reports potential for cdiff which she had in the past and does not wish to take anything  that can potentially cause again    Ciprofloxacin Nausea Only    Flagyl [Metronidazole] Nausea Only    Flexeril [Cyclobenzaprine]     Hemophilus B Polysaccharide Vaccine Hives     Annotation - [id number]: local rxtn w N/V 9/14    Influenza Virus Vaccine      Annotation - [id number]: local rxtn w N/V 9/14    Morphine     Onion - Food Allergy Hives      Immunizations:     Immunization History   Administered Date(s) Administered    COVID-19 PFIZER VACCINE 0 3 ML IM 03/23/2021, 04/03/2021, 09/20/2021, 04/03/2022    INFLUENZA 09/06/2017, 10/03/2020, 10/20/2021    Influenza Quadrivalent, 6-35 Months IM 09/06/2017    Influenza, recombinant, quadrivalent,injectable, preservative free 09/20/2019    Influenza, seasonal, injectable 11/06/2011, 09/23/2013, 09/23/2014    Pneumococcal Conjugate 13-Valent 06/14/2021    Tdap 11/17/2015      Health Maintenance:         Topic Date Due    Breast Cancer Screening: Mammogram  02/28/2023    Cervical Cancer Screening  04/15/2026    Colorectal Cancer Screening  01/12/2027    Hepatitis C Screening  Completed         Topic Date Due    Pneumococcal Vaccine: 65+ Years (2 - PPSV23 or PCV20) 06/14/2022    Influenza Vaccine (1) 09/01/2022      Medicare Screening Tests and Risk Assessments: Carmen Matthew is here for her Initial Wellness visit  Last Medicare Wellness visit information reviewed, patient interviewed and updates made to the record today  Health Risk Assessment:   Patient rates overall health as good  Patient feels that their physical health rating is slightly better  Patient is dissatisfied with their life  Eyesight was rated as slightly worse  Hearing was rated as same  Patient feels that their emotional and mental health rating is slightly worse  Patients states they are often angry  Patient states they are always unusually tired/fatigued   Pain experienced in the last 7 days has been some  Patient's pain rating has been 7/10  Patient states that she has experienced weight loss or gain in last 6 months  Depression Screening:   PHQ-9 Score: 15      Fall Risk Screening: In the past year, patient has experienced: history of falling in past year    Number of falls: 1  Injured during fall?: No    Feels unsteady when standing or walking?: No    Worried about falling?: Yes      Urinary Incontinence Screening:   Patient has not leaked urine accidently in the last six months  Home Safety:  Patient does not have trouble with stairs inside or outside of their home  Patient has working smoke alarms and has working carbon monoxide detector  Home safety hazards include: none  Nutrition:   Current diet is Regular  Eats one meal a day, follows a bland diet due to stomach issues     Medications:   Patient is currently taking over-the-counter supplements  OTC medications include: see medication list  Patient is able to manage medications  Activities of Daily Living (ADLs)/Instrumental Activities of Daily Living (IADLs):   Walk and transfer into and out of bed and chair?: Yes  Dress and groom yourself?: Yes    Bathe or shower yourself?: Yes    Feed yourself? Yes  Do your laundry/housekeeping?: Yes  Manage your money, pay your bills and track your expenses?: Yes  Make your own meals?: Yes    Do your own shopping?: Yes    Previous Hospitalizations:   Any hospitalizations or ED visits within the last 12 months?: No      Advance Care Planning:   Living will: Yes    Durable POA for healthcare:  Yes    Advanced directive: Yes    End of Life Decisions reviewed with patient: Yes    Provider agrees with end of life decisions: Yes      Cognitive Screening:   Provider or family/friend/caregiver concerned regarding cognition?: No    PREVENTIVE SCREENINGS      Cardiovascular Screening:    General: Screening Current      Diabetes Screening:     General: Screening Current Colorectal Cancer Screening:     General: Screening Current      Breast Cancer Screening:     General: Screening Current      Cervical Cancer Screening:    General: Screening Not Indicated      Osteoporosis Screening:    General: Screening Current      Abdominal Aortic Aneurysm (AAA) Screening:        General: Screening Not Indicated      Lung Cancer Screening:     General: Screening Not Indicated      Hepatitis C Screening:    General: Screening Current    Hep C Screening Accepted: No     Screening, Brief Intervention, and Referral to Treatment (SBIRT)    Screening  Typical number of drinks in a day: 1  Typical number of drinks in a week: 5  Interpretation: Low risk drinking behavior  Single Item Drug Screening:  How often have you used an illegal drug (including marijuana) or a prescription medication for non-medical reasons in the past year? never    Single Item Drug Screen Score: 0  Interpretation: Negative screen for possible drug use disorder    No exam data present     Physical Exam:     /78   Pulse 74   Temp (!) 96 7 °F (35 9 °C) (Temporal)   Resp 16   Ht 4' 11" (1 499 m)   Wt 58 5 kg (129 lb)   SpO2 96%   BMI 26 05 kg/m²     Physical Exam  Vitals and nursing note reviewed  Constitutional:       General: She is not in acute distress  Appearance: She is well-developed  HENT:      Head: Normocephalic and atraumatic  Eyes:      Conjunctiva/sclera: Conjunctivae normal    Neck:      Vascular: No carotid bruit  Cardiovascular:      Rate and Rhythm: Normal rate and regular rhythm  Heart sounds: No murmur heard  Pulmonary:      Effort: Pulmonary effort is normal  No respiratory distress  Breath sounds: Normal breath sounds  Abdominal:      Palpations: Abdomen is soft  Tenderness: There is no abdominal tenderness  Comments:  no abdominal bruit    Musculoskeletal:      Cervical back: Neck supple  Skin:     General: Skin is warm and dry     Neurological:      Mental Status: She is alert  Psychiatric:         Attention and Perception: Attention normal          Mood and Affect: Depressed: Slight  Speech: Speech normal          Behavior: Behavior is cooperative           Cognition and Memory: Cognition normal           Kamar Pradhan, DO

## 2022-07-21 ENCOUNTER — TELEPHONE (OUTPATIENT)
Dept: ADMINISTRATIVE | Facility: OTHER | Age: 67
End: 2022-07-21

## 2022-07-21 NOTE — TELEPHONE ENCOUNTER
----- Message from Romayne Chars, DO sent at 4/43/0875 12:14 PM EDT -----  Regarding: care gap request  07/19/22 12:14 PM    Hello, our patient attached above has had CRC: Colonoscopy completed/performed  Please assist in updating the patient chart by pulling the Care Everywhere (CE) document   The date of service is Procedures  - in this encounter    Procedures  Procedure Name Priority Date/Time Associated Diagnosis Comments  COLONOSCOPY Routine 01/12/2017 2:15 PM EST              Thank you,  Romayne Chars, DO  [facility] [address]

## 2022-07-25 NOTE — TELEPHONE ENCOUNTER
Upon review of the In Basket request we were able to note that no further action is required  The patient chart is up to date as a result of a previous request       Any additional questions or concerns should be emailed to the Practice Liaisons via [email or web address] email, please do not reply via In Basket      Thank you  Amanda Bullock

## 2022-07-27 ENCOUNTER — TELEPHONE (OUTPATIENT)
Dept: FAMILY MEDICINE CLINIC | Facility: CLINIC | Age: 67
End: 2022-07-27

## 2022-07-27 NOTE — TELEPHONE ENCOUNTER
New patient to our practice who has Dr Miko Hector listed on her care team as a PCP-[facility] Attributed-Roster  Patient no longer has this insurance    After reviewing her AVS, patient contacted our office to have Dr Alan Lyons removed from her Care Team

## 2023-01-11 DIAGNOSIS — F51.04 PSYCHOPHYSIOLOGICAL INSOMNIA: ICD-10-CM

## 2023-01-12 ENCOUNTER — OFFICE VISIT (OUTPATIENT)
Dept: FAMILY MEDICINE CLINIC | Facility: CLINIC | Age: 68
End: 2023-01-12

## 2023-01-12 ENCOUNTER — NURSE TRIAGE (OUTPATIENT)
Dept: OTHER | Facility: OTHER | Age: 68
End: 2023-01-12

## 2023-01-12 VITALS
TEMPERATURE: 98.5 F | WEIGHT: 129 LBS | HEART RATE: 83 BPM | OXYGEN SATURATION: 97 % | HEIGHT: 59 IN | SYSTOLIC BLOOD PRESSURE: 102 MMHG | DIASTOLIC BLOOD PRESSURE: 62 MMHG | BODY MASS INDEX: 26 KG/M2 | RESPIRATION RATE: 16 BRPM

## 2023-01-12 DIAGNOSIS — Z20.818 EXPOSURE TO STREP THROAT: Primary | ICD-10-CM

## 2023-01-12 DIAGNOSIS — J02.0 PHARYNGITIS DUE TO STREPTOCOCCUS SPECIES: ICD-10-CM

## 2023-01-12 LAB — S PYO AG THROAT QL: NEGATIVE

## 2023-01-12 RX ORDER — AZITHROMYCIN 250 MG/1
TABLET, FILM COATED ORAL
Qty: 6 TABLET | Refills: 0 | Status: SHIPPED | OUTPATIENT
Start: 2023-01-12 | End: 2023-01-16

## 2023-01-12 RX ORDER — AMITRIPTYLINE HYDROCHLORIDE 10 MG/1
TABLET, FILM COATED ORAL
Qty: 180 TABLET | Refills: 1 | Status: SHIPPED | OUTPATIENT
Start: 2023-01-12

## 2023-01-12 NOTE — LETTER
January 12, 2023     Patient: Suzy Barraza  YOB: 1955  Date of Visit: 1/12/2023      To Whom it May Concern: Luke Al is under my professional care  Juan Antonio Gaspar was seen in my office on 1/12/2023  Juan Antonio Gaspar may return to work on Next week  Medically excused from work 1/12 and 1/13    If you have any questions or concerns, please don't hesitate to call           Sincerely,       Eliza Pod, DO

## 2023-01-12 NOTE — TELEPHONE ENCOUNTER
Patient reports severe sore throat pain, cough, and fever  She tested negative for COVID on multiple tests and was exposed to strep  Patient would like to be seen today in the office  Appointment made for 1100  Reason for Disposition  • SEVERE sore throat pain    Answer Assessment - Initial Assessment Questions  1  ONSET: "When did the throat start hurting?" (Hours or days ago)       1/11/23  2  SEVERITY: "How bad is the sore throat?" (Scale 1-10; mild, moderate or severe)    - MILD (1-3):  doesn't interfere with eating or normal activities    - MODERATE (4-7): interferes with eating some solids and normal activities    - SEVERE (8-10):  excruciating pain, interferes with most normal activities    - SEVERE DYSPHAGIA: can't swallow liquids, drooling      Severe  3  STREP EXPOSURE: "Has there been any exposure to strep within the past week?" If Yes, ask: "What type of contact occurred?"       Possible   4  VIRAL SYMPTOMS: "Are there any symptoms of a cold, such as a runny nose, cough, hoarse voice or red eyes?"       Cough  5  FEVER: "Do you have a fever?" If Yes, ask: "What is your temperature, how was it measured, and when did it start?"      Yes 101   6  PUS ON THE TONSILS: "Is there pus on the tonsils in the back of your throat?"      Denies   7  OTHER SYMPTOMS: "Do you have any other symptoms?" (e g , difficulty breathing, headache, rash)      Denies   8   PREGNANCY: "Is there any chance you are pregnant?" "When was your last menstrual period?"      N/A    Protocols used: SORE THROAT-ADULT-OH

## 2023-01-12 NOTE — TELEPHONE ENCOUNTER
Regarding: sore throat  ----- Message from Jose Plummer RN sent at 1/12/2023  8:07 AM EST -----  "My throat hurts and I have been coughing   I did take a couple COVID tests and they are negative "

## 2023-01-14 ENCOUNTER — OFFICE VISIT (OUTPATIENT)
Dept: URGENT CARE | Facility: MEDICAL CENTER | Age: 68
End: 2023-01-14

## 2023-01-14 VITALS
RESPIRATION RATE: 20 BRPM | OXYGEN SATURATION: 97 % | HEART RATE: 89 BPM | TEMPERATURE: 99.4 F | HEIGHT: 59 IN | BODY MASS INDEX: 25.6 KG/M2 | WEIGHT: 127 LBS

## 2023-01-14 DIAGNOSIS — H66.003 NON-RECURRENT ACUTE SUPPURATIVE OTITIS MEDIA OF BOTH EARS WITHOUT SPONTANEOUS RUPTURE OF TYMPANIC MEMBRANES: Primary | ICD-10-CM

## 2023-01-14 RX ORDER — CIPROFLOXACIN AND DEXAMETHASONE 3; 1 MG/ML; MG/ML
4 SUSPENSION/ DROPS AURICULAR (OTIC) 2 TIMES DAILY
Qty: 7.5 ML | Refills: 0 | Status: SHIPPED | OUTPATIENT
Start: 2023-01-14

## 2023-01-14 NOTE — PROGRESS NOTES
[facility]    NAME: Angie Guzman is a 78 y o  female  : 1955    MRN: [id number]  DATE: 2023  TIME: 10:20 AM    Assessment and Plan   Non-recurrent acute suppurative otitis media of both ears without spontaneous rupture of tympanic membranes [[id number] 003]  1  Non-recurrent acute suppurative otitis media of both ears without spontaneous rupture of tympanic membranes  ciprofloxacin-dexamethasone (CIPRODEX) otic suspension        Will start patient on trail of Ciprodex  Provided patient with precautionary measures     Patient Instructions   There are no Patient Instructions on file for this visit  Follow up with PCP in 3-5 days  Proceed to ER if symptoms worsen  Chief Complaint     Chief Complaint   Patient presents with   • Earache     Patient complains of pain in both ears that started yesterday,  she is currently being treated for strep with z-pack  Patient has had two negative covid tests     History of Present Illness   Earache   There is pain in both ears  This is a new problem  The current episode started yesterday  The problem has been unchanged  There has been no fever  The pain is mild  Associated symptoms include coughing and headaches  Pertinent negatives include no ear discharge, rhinorrhea or sore throat  Associated symptoms comments: Recent URI, currently on antibiotics  She has tried nothing for the symptoms  The treatment provided no relief  Review of Systems   Review of Systems   HENT: Positive for ear pain  Negative for ear discharge, rhinorrhea and sore throat  Respiratory: Positive for cough  Neurological: Positive for headaches  All other systems reviewed and are negative  The following portions of the patient's history were reviewed and updated as appropriate: allergies, current medications, past family history, past medical history, past social history, past surgical history and problem list      Medications have been verified    Objective Pulse 89   Temp 99 4 °F (37 4 °C)   Resp 20   Ht 4' 11" (1 499 m)   Wt 57 6 kg (127 lb)   SpO2 97%   BMI 25 65 kg/m²     Physical Exam  Vitals reviewed  Constitutional:       General: She is not in acute distress  Appearance: Normal appearance  She is well-developed  She is not ill-appearing, toxic-appearing or diaphoretic  HENT:      Head: Normocephalic and atraumatic  Right Ear: Tympanic membrane and external ear normal  There is impacted cerumen  Left Ear: Tympanic membrane and external ear normal  There is impacted cerumen  Ears:      Comments: Extensive right cancel erythema with discharge, TM intact with no significant posterior fluid collection  Mild left ear canal erythema with mild discharge, TM intact with no posterior  fluid collection      Nose: Rhinorrhea present  No congestion  Comments: Bilaterally inflamed nasal turbinates  Eyes:      General:         Right eye: No discharge  Left eye: No discharge  Extraocular Movements: Extraocular movements intact  Conjunctiva/sclera: Conjunctivae normal       Pupils: Pupils are equal, round, and reactive to light  Musculoskeletal:      Cervical back: Normal range of motion  Lymphadenopathy:      Cervical: No cervical adenopathy  Neurological:      Mental Status: She is alert         Theodore Salter MD

## 2023-01-15 ENCOUNTER — OFFICE VISIT (OUTPATIENT)
Dept: URGENT CARE | Facility: MEDICAL CENTER | Age: 68
End: 2023-01-15

## 2023-01-15 VITALS
HEIGHT: 59 IN | HEART RATE: 94 BPM | SYSTOLIC BLOOD PRESSURE: 124 MMHG | WEIGHT: 126.98 LBS | RESPIRATION RATE: 20 BRPM | BODY MASS INDEX: 25.6 KG/M2 | DIASTOLIC BLOOD PRESSURE: 60 MMHG | TEMPERATURE: 100.2 F

## 2023-01-15 DIAGNOSIS — H69.93 DISORDER OF BOTH EUSTACHIAN TUBES: Primary | ICD-10-CM

## 2023-01-15 NOTE — PROGRESS NOTES
[facility]        NAME: David Maldonado is a 78 y o  female  : 1955    MRN: [id number]  DATE: January 15, 2023  TIME: 3:26 PM    Assessment and Plan   Disorder of both eustachian tubes [H69 93]  1  Disorder of both eustachian tubes          Recommend start a decongestant to help with congestion and pressure on the ears  Consider afrin or flonase also   Cotton balls in ears to balance pressure   If no improvement with otc medication f/u with pcp or ent specialist   Pt and spouse in agreement with plan of care    Patient Instructions     Follow up with PCP in 3-5 days  Proceed to  ER if symptoms worsen  Chief Complaint     Chief Complaint   Patient presents with   • Ear Problem     Patient presents with decreased hearing in both ears         History of Present Illness   David Maldonado presents to the clinic c/o    Pt states on Thursday started with a sore throat after being exposed to strep   Went to see pcp who tested her for strep and the rapid test was negative   Started on zithromax   Was seen here in the office yesterday for bilateral ear pain and decreased hearing  States her ears were flushed out and started on an ear drop   No improvement in hearing or pain   Notes nasal congestion - is not taking anything for it  Has 1 more day left of the antibiotics  Was not tested for covid or flu when she had fevers/sore throat      Review of Systems   Review of Systems   All other systems reviewed and are negative          Current Medications     Long-Term Medications   Medication Sig Dispense Refill   • amitriptyline (ELAVIL) 10 mg tablet TAKE 2 TABLETS BY MOUTH DAILY AT BEDTIME 180 tablet 1   • cetirizine (ZyrTEC) 10 mg tablet Take 10 mg by mouth daily     • famotidine (PEPCID) 40 MG tablet Take 20 mg by mouth 2 (two) times a day     • hyoscyamine (ANASPAZ,LEVSIN) 0 125 MG tablet Take 0 125 mg by mouth if needed     • ondansetron (ZOFRAN-ODT) 4 mg disintegrating tablet DISSOLVE 1 TABLET ON TOP OF TONGUE EVERY 8 HOURS     • ciprofloxacin-dexamethasone (CIPRODEX) otic suspension Administer 4 drops into both ears 2 (two) times a day 7 5 mL 0       Current Allergies     Allergies as of 01/15/2023 - Reviewed 01/15/2023   Allergen Reaction Noted   • Cephalexin GI Intolerance and Other (See Comments) 12/21/2020   • Sulfamethoxazole-trimethoprim Other (See Comments) 12/21/2020   • Ciprofloxacin Nausea Only 02/01/2018   • Flagyl [metronidazole] Nausea Only 02/01/2018   • Flexeril [cyclobenzaprine]  03/18/2017   • Hemophilus b polysaccharide vaccine Hives 09/25/2014   • Influenza virus vaccine  09/25/2014   • Morphine  03/18/2017   • Onion - food allergy Hives 01/22/2015            The following portions of the patient's history were reviewed and updated as appropriate: allergies, current medications, past family history, past medical history, past social history, past surgical history and problem list     Objective   /60   Pulse 94   Temp 100 2 °F (37 9 °C)   Resp 20   Ht 4' 11" (1 499 m)   Wt 57 6 kg (126 lb 15 8 oz)   BMI 25 65 kg/m²        Physical Exam     Physical Exam  Vitals and nursing note reviewed  Constitutional:       Appearance: Normal appearance  She is well-developed  HENT:      Head: Normocephalic and atraumatic  Right Ear: Hearing, ear canal and external ear normal  Tympanic membrane is bulging  Left Ear: Hearing, ear canal and external ear normal  Tympanic membrane is bulging  Nose: Mucosal edema and congestion present  Right Sinus: No maxillary sinus tenderness or frontal sinus tenderness  Left Sinus: No maxillary sinus tenderness or frontal sinus tenderness  Eyes:      General: Lids are normal       Conjunctiva/sclera: Conjunctivae normal    Cardiovascular:      Rate and Rhythm: Normal rate and regular rhythm        Heart sounds: Normal heart sounds, S1 normal and S2 normal    Pulmonary:      Effort: Pulmonary effort is normal       Breath sounds: Normal breath sounds  Skin:     General: Skin is warm and dry  Neurological:      Mental Status: She is alert and oriented to person, place, and time  Psychiatric:         Speech: Speech normal          Behavior: Behavior normal  Behavior is cooperative  Thought Content:  Thought content normal          Judgment: Judgment normal

## 2023-01-15 NOTE — PATIENT INSTRUCTIONS
Recommend Sudafed 12 hour or Aleve D (pain reliever with the sudafed 12 hour)  Must ask the pharmacist for this medication as they keep it behind the pharmacy counter  Must have ID on you for the purchase  May also trial Afrin or Flonase nasal spray   Recommend cotton balls in the ear to help balance pressure   If no improvement f/u with PCP or ENT specialist    Eustachian Tube Dysfunction   WHAT YOU NEED TO KNOW:   What is eustachian tube dysfunction (ETD)? ETD is a condition that prevents your eustachian tubes from opening properly  It can also cause them to become blocked  Eustachian tubes connect your middle ear to the back of your nose and throat  These tubes open and allow air to flow in and out when you sneeze, swallow, or yawn  What causes or increases my risk for ETD? ETD may be caused by swelling or buildup of mucus in your eustachian tubes  Pressure can build if you travel in an airplane or go scuba diving  Allergies, a cold, or a sinus infection can cause mucus to build up  The following can also increase your risk:  Smoking cigarettes    GERD, chronic sinus inflammation, or a tumor in your nose or throat    An immune system disorder    Sleeping on your stomach    In children, long-term use of a bottle, going to , or a condition such as a cleft palate    What are the signs and symptoms of ETD? Fullness or pressure in your ears    Muffled hearing, or a feeling you are hearing under water or have clogged ears    Pain in one or both ears    Ringing in your ears    Popping, crackling, or clicking feeling in your ears    Trouble keeping your balance    How is ETD diagnosed? ETD is most common in children younger than 5 years  Adults with ETD may have had it since childhood  ETD can sometimes begin in adulthood, usually because of certain medical conditions that have developed  Your healthcare provider will ask about your symptoms and when they began   He or she will examine your ears, your nose, and the back of your throat  He or she may also do a hearing test   How is ETD treated? ETD may get better on its own without any treatment  If it continues, you may need any of the following:  Swallow, yawn, or chew gum  to help open your eustachian tubes  Your healthcare provider may also recommend you blow with your mouth shut and your nostrils pinched closed  Air pressure devices  push air into your nose and eustachian tubes to help relieve air pressure in your ear  Treatment for allergies  such as decongestants, antihistamines, and nasal steroids may improve ETD  They may help decrease swelling of the eustachian tubes  A myringotomy  is surgery to make a hole in your eardrum  The hole relieves pressure and lets fluid drain from your ear  A pressure equalizing (PE) tube may be used to keep the hole open and to help drain fluid  Tuboplasty  is a procedure to widen your eustachian tubes  When should I call my doctor? Your symptoms do not improve or get worse  You have a fever  You have any hearing loss  You have questions or concerns about your condition or care  CARE AGREEMENT:   You have the right to help plan your care  Learn about your health condition and how it may be treated  Discuss treatment options with your healthcare providers to decide what care you want to receive  You always have the right to refuse treatment  The above information is an  only  It is not intended as medical advice for individual conditions or treatments  Talk to your doctor, nurse or pharmacist before following any medical regimen to see if it is safe and effective for you  © Copyright [employer] 2022 Information is for End User's use only and may not be sold, redistributed or otherwise used for commercial purposes   All illustrations and images included in CareNotes® are the copyrighted property of A D A M , Inc  or [address] [place], [place] [address]

## 2023-01-16 ENCOUNTER — OFFICE VISIT (OUTPATIENT)
Dept: FAMILY MEDICINE CLINIC | Facility: CLINIC | Age: 68
End: 2023-01-16

## 2023-01-16 VITALS
HEIGHT: 59 IN | HEART RATE: 108 BPM | WEIGHT: 128 LBS | BODY MASS INDEX: 25.8 KG/M2 | OXYGEN SATURATION: 98 % | TEMPERATURE: 96.1 F

## 2023-01-16 DIAGNOSIS — J01.41 ACUTE RECURRENT PANSINUSITIS: ICD-10-CM

## 2023-01-16 DIAGNOSIS — H69.83 DYSFUNCTION OF BOTH EUSTACHIAN TUBES: Primary | ICD-10-CM

## 2023-01-16 RX ORDER — PREDNISONE 10 MG/1
TABLET ORAL
Qty: 21 EACH | Refills: 0 | Status: SHIPPED | OUTPATIENT
Start: 2023-01-16 | End: 2023-01-16 | Stop reason: SDUPTHER

## 2023-01-16 RX ORDER — PREDNISONE 10 MG/1
TABLET ORAL
Qty: 21 EACH | Refills: 0 | Status: SHIPPED | OUTPATIENT
Start: 2023-01-16 | End: 2023-01-18 | Stop reason: SDUPTHER

## 2023-01-16 NOTE — PROGRESS NOTES
Patient is here here for follow-up on symptoms  Was not name: Marcell Robin      : 1955      MRN: [id number]  Encounter Provider: Arsalan Palafox DO  Encounter Date: 2023   Encounter department: [facility] PRIMARY C[facility]    Assessment & Plan     1  Dysfunction of both eustachian tubes  -     predniSONE 10 MG (21) TBPK; As directed take 6-5-4-3-2-1    2  Acute recurrent pansinusitis  -     predniSONE 10 MG (21) TBPK; As directed take 6-5-4-3-2-1         Reviewed with patient that azithromycin is continuing to have clinical benefit for total of 10 days  There may be some viral component  Patient will use Flonase, hydration Mucinex etc   It does appear that her symptoms of ear plugging are likely from eustachian tube dysfunction and prednisone taper recommended to assist   Subjective     Patient is a 29-year-old female seen here in follow-up from 2 visits to urgent care with slightly conflicting diagnosis  Still has significant feeling of blocked ear and decreased hearing and generally respiratory and upper symptoms not improved to a great degree  She has finished a Z-Zach     Chief Complaint   Patient presents with   • Follow-up     Check ears Pt states she finished Z pack, UC gave her ear drops to use and another provider told her to take Aleve cold and Sinus, she states she is not feeling any better and is having drainage from both ears      Urgent care visit  as well as January 15 [facility] now   visit was acute suppurative otitis media and was given Ciprodex-exam notes extensive right canal erythema with discharge  Also apparently impacted cerumen bilaterally prior to provider using instrumentation to pick the wax out per patient  Revisit following day indicated eustachian tube dysfunction  Recommendation for Afrin or Flonase    She was seen by me on  with concerns about exposure to "strep "-ears were not particularly overly symptomatic at that time, she did have a plugged ear sensation  Review of Systems    Past Medical History:   Diagnosis Date   • C  difficile diarrhea     resolved 06 jun 2017   • Depression    • Elevated CEA     resolved 06 sep 2017   • GERD (gastroesophageal reflux disease)    • Headache(784 0)    • Post herpetic neuralgia 9/18/2018   • Reactive airway disease     RESOLVED 06 NOV 2017   • Rotator cuff tear    • Shingles    • Sleep disorder     RESOLVED 06 NOV 2017   • Urinary tract infection      Past Surgical History:   Procedure Laterality Date   • BREAST BIOPSY     • COLONOSCOPY     • CYST REMOVAL  2022 2 cysts on head removed and biopsied   • DILATION AND CURETTAGE OF UTERUS     • FRACTURE SURGERY     • ROTATOR CUFF REPAIR     • TUBAL LIGATION     • WRIST FRACTURE SURGERY       Family History   Problem Relation Age of Onset   • Hyperlipidemia Mother    • Hypertension Mother    • Heart disease Mother         CARDIAC DISORDER   • Hearing loss Mother    • Heart disease Father         CARDIAC DISORDER   • Heart failure Father         CONGESTIVE HEART FAILURE   • Stroke Father    • Alcohol abuse Brother    • Drug abuse Brother    • Substance Abuse Brother      Social History     Socioeconomic History   • Marital status: /Civil Union     Spouse name: None   • Number of children: None   • Years of education: None   • Highest education level: None   Occupational History   • 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   Tobacco Use   • Smoking status: Never   • Smokeless tobacco: Never   Vaping Use   • Vaping Use: Never used   Substance and Sexual Activity   • Alcohol use:  Yes     Alcohol/week: 7 0 standard drinks     Types: 7 Glasses of wine per week     Comment: daily  SOCIAL    • Drug use: Never   • Sexual activity: Not Currently     Partners: Male   Other Topics Concern   • None   Social History Narrative    EXERCISES REGULARLY     Social Determinants of Health     Financial Resource Strain: Not on file   Food Insecurity: Not on file   Transportation Needs: Not on file   Physical Activity: Not on file   Stress: Not on file   Social Connections: Not on file   Intimate Partner Violence: Not on file   Housing Stability: Not on file     Current Outpatient Medications on File Prior to Visit   Medication Sig   • amitriptyline (ELAVIL) 10 mg tablet TAKE 2 TABLETS BY MOUTH DAILY AT BEDTIME   • cetirizine (ZyrTEC) 10 mg tablet Take 10 mg by mouth daily   • ciprofloxacin-dexamethasone (CIPRODEX) otic suspension Administer 4 drops into both ears 2 (two) times a day   • famotidine (PEPCID) 40 MG tablet Take 20 mg by mouth 2 (two) times a day   • hyoscyamine (ANASPAZ,LEVSIN) 0 125 MG tablet Take 0 125 mg by mouth if needed   • ondansetron (ZOFRAN-ODT) 4 mg disintegrating tablet DISSOLVE 1 TABLET ON TOP OF TONGUE EVERY 8 HOURS   • azithromycin (ZITHROMAX) 250 mg tablet Take 2 tablets today then 1 tablet daily x 4 days (Patient not taking: Reported on 1/16/2023)     Allergies   Allergen Reactions   • Cephalexin GI Intolerance and Other (See Comments)     reports potential for cdiff which she had in the past and does not wish to take anything  that can potentially cause again   • Sulfamethoxazole-Trimethoprim Other (See Comments)     reports potential for cdiff which she had in the past and does not wish to take anything  that can potentially cause again   • Ciprofloxacin Nausea Only   • Flagyl [Metronidazole] Nausea Only   • Flexeril [Cyclobenzaprine]    • Hemophilus B Polysaccharide Vaccine Hives     Annotation - [id number]: local rxtn w N/V 9/14   • Influenza Virus Vaccine      Annotation - [id number]: local rxtn w N/V 9/14   • Morphine    • Onion - Food Allergy Hives     Immunization History   Administered Date(s) Administered   • COVID-19 PFIZER VACCINE 0 3 ML IM 03/23/2021, 04/03/2021, 09/20/2021, 04/03/2022   • INFLUENZA 09/06/2017, 10/03/2020, 10/20/2021, 10/19/2022   • Influenza Quadrivalent, 6-35 Months IM 09/06/2017 • Influenza, recombinant, quadrivalent,injectable, preservative free 09/20/2019   • Influenza, seasonal, injectable 11/06/2011, 09/23/2013, 09/23/2014   • Pneumococcal Conjugate 13-Valent 06/14/2021   • Tdap 11/17/2015       Objective     Pulse (!) 108   Temp (!) 96 1 °F (35 6 °C) (Temporal)   Ht 4' 11" (1 499 m)   Wt 58 1 kg (128 lb)   SpO2 98%   BMI 25 85 kg/m²     Physical Exam  Constitutional:       Comments: Appears fatigued   HENT:      Right Ear: Tympanic membrane normal       Left Ear: Tympanic membrane normal       Ears:      Comments: There is no cerumen impaction  There is some residual eardrops solution noted but both tympanic membranes are seen clearly  They do have a look of decreased mobility and possible retraction  There is no erythema     Nose: Congestion and rhinorrhea present  Cardiovascular:      Rate and Rhythm: Normal rate and regular rhythm  Pulmonary:      Effort: Pulmonary effort is normal  No respiratory distress  Neurological:      Mental Status: She is alert and oriented to person, place, and time  Psychiatric:         Mood and Affect: Mood normal          Behavior: Behavior normal          Thought Content:  Thought content normal          Judgment: Judgment normal        Mercedes Gomez,

## 2023-01-18 DIAGNOSIS — H69.83 DYSFUNCTION OF BOTH EUSTACHIAN TUBES: ICD-10-CM

## 2023-01-18 DIAGNOSIS — J01.41 ACUTE RECURRENT PANSINUSITIS: ICD-10-CM

## 2023-01-18 RX ORDER — PREDNISONE 10 MG/1
TABLET ORAL
Qty: 21 EACH | Refills: 0 | Status: SHIPPED | OUTPATIENT
Start: 2023-01-18 | End: 2023-02-13

## 2023-01-19 DIAGNOSIS — J01.41 ACUTE RECURRENT PANSINUSITIS: Primary | ICD-10-CM

## 2023-01-19 DIAGNOSIS — Z20.818 EXPOSURE TO STREP THROAT: ICD-10-CM

## 2023-01-19 DIAGNOSIS — H69.83 DYSFUNCTION OF BOTH EUSTACHIAN TUBES: ICD-10-CM

## 2023-02-13 ENCOUNTER — OFFICE VISIT (OUTPATIENT)
Dept: FAMILY MEDICINE CLINIC | Facility: CLINIC | Age: 68
End: 2023-02-13

## 2023-02-13 ENCOUNTER — HOSPITAL ENCOUNTER (OUTPATIENT)
Dept: CT IMAGING | Facility: HOSPITAL | Age: 68
Discharge: HOME | End: 2023-02-13

## 2023-02-13 VITALS
SYSTOLIC BLOOD PRESSURE: 120 MMHG | TEMPERATURE: 96.6 F | HEIGHT: 59 IN | BODY MASS INDEX: 25.4 KG/M2 | RESPIRATION RATE: 16 BRPM | OXYGEN SATURATION: 96 % | HEART RATE: 84 BPM | WEIGHT: 126 LBS | DIASTOLIC BLOOD PRESSURE: 68 MMHG

## 2023-02-13 DIAGNOSIS — K21.9 GASTROESOPHAGEAL REFLUX DISEASE WITHOUT ESOPHAGITIS: Primary | ICD-10-CM

## 2023-02-13 DIAGNOSIS — J01.41 ACUTE RECURRENT PANSINUSITIS: Primary | ICD-10-CM

## 2023-02-13 DIAGNOSIS — R05.2 SUBACUTE COUGH: ICD-10-CM

## 2023-02-13 DIAGNOSIS — J01.41 ACUTE RECURRENT PANSINUSITIS: ICD-10-CM

## 2023-02-13 RX ORDER — HYDROCODONE POLISTIREX AND CHLORPHENIRAMINE POLISTIREX 10; 8 MG/5ML; MG/5ML
5 SUSPENSION, EXTENDED RELEASE ORAL EVERY 12 HOURS PRN
Qty: 115 ML | Refills: 0 | Status: SHIPPED | OUTPATIENT
Start: 2023-02-13

## 2023-02-13 RX ORDER — CEFUROXIME AXETIL 500 MG/1
500 TABLET ORAL EVERY 12 HOURS SCHEDULED
Qty: 20 TABLET | Refills: 0 | Status: SHIPPED | OUTPATIENT
Start: 2023-02-13 | End: 2023-02-23

## 2023-02-13 RX ORDER — FAMOTIDINE 20 MG/1
20 TABLET, FILM COATED ORAL 2 TIMES DAILY
Qty: 60 TABLET | Refills: 3 | Status: SHIPPED | OUTPATIENT
Start: 2023-02-13 | End: 2023-05-22

## 2023-02-13 NOTE — PROGRESS NOTES
Name: Viral Mina      : 1955      MRN: [id number]  Encounter Provider: Heather Ramos DO  Encounter Date: 2023   Encounter department: [facility] PRIMARY CARE    Assessment & Plan     1  Acute recurrent pansinusitis  -     CT sinus wo contrast; Future; Expected date: 2023  -     cefuroxime (CEFTIN) 500 mg tablet; Take 1 tablet (500 mg total) by mouth every 12 (twelve) hours for 10 days  -     Hydrocod Jan-Chlorphe Jan ER (TUSSIONEX) 10-8 mg/5 mL ER suspension; Take 5 mL by mouth every 12 (twelve) hours as needed for cough Max Daily Amount: 10 mL    2  Subacute cough  -     Hydrocod Jan-Chlorphe Jan ER (TUSSIONEX) 10-8 mg/5 mL ER suspension; Take 5 mL by mouth every 12 (twelve) hours as needed for cough Max Daily Amount: 10 mL         Return if symptoms worsen or fail to improve, for per CT sinus/ENT  Subjective     Chief Complaint   Patient presents with   • Cold Like Symptoms     Pt is here for cough and  sore throat  Pt tested negative for home Covid test    HPI     recent travel  Was sick before she went  Has symptoms of eustachian tube dysfunction  Did see ENT  Acute recurrent pansinusitis  Culture was obtained  Was on antibiotics, Flonase, prednisone taper  Final cultures haemophilus influenza    Specimen Information: Head; Wound    1 Result Note  Wound Culture 2+ Growth of Haemophilus influenzae                GRAM STAIN RESULT  No Polys or Bacteria seen              Susceptibility     Haemophilus influenzae     RICK Not Specified     Ampicillin ($$)   Susceptible     Beta Lactamase  Negative       Ceftriaxone ($$)   Susceptible     Cefuroxime-Sodium   Susceptible     Trimethoprim + Sulfamethoxazole ($$$)   Susceptible             Was prescribed cefuroxime    Started on  an took  until 2/10 (came home)    See under chief complaint    Review of Systems   Constitutional: Positive for fatigue  HENT: Positive for postnasal drip and rhinorrhea  Ear symptoms of blockage is better   Respiratory: Positive for cough (Persists)  Cardiovascular: Negative for chest pain  Past Medical History:   Diagnosis Date   • C  difficile diarrhea     resolved 06 jun 2017   • Depression    • Elevated CEA     resolved 06 sep 2017   • GERD (gastroesophageal reflux disease)    • Headache(784 0)    • Post herpetic neuralgia 9/18/2018   • Reactive airway disease     RESOLVED 06 NOV 2017   • Rotator cuff tear    • Shingles    • Sleep disorder     RESOLVED 06 NOV 2017   • Urinary tract infection      Past Surgical History:   Procedure Laterality Date   • BREAST BIOPSY     • COLONOSCOPY     • CYST REMOVAL  2022 2 cysts on head removed and biopsied   • DILATION AND CURETTAGE OF UTERUS     • FRACTURE SURGERY     • ROTATOR CUFF REPAIR     • TUBAL LIGATION     • WRIST FRACTURE SURGERY       Family History   Problem Relation Age of Onset   • Hyperlipidemia Mother    • Hypertension Mother    • Heart disease Mother         CARDIAC DISORDER   • Hearing loss Mother    • Heart disease Father         CARDIAC DISORDER   • Heart failure Father         CONGESTIVE HEART FAILURE   • Stroke Father    • Alcohol abuse Brother    • Drug abuse Brother    • Substance Abuse Brother      Social History     Socioeconomic History   • Marital status: /Civil Union     Spouse name: None   • Number of children: None   • Years of education: None   • Highest education level: None   Occupational History   • 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   Tobacco Use   • Smoking status: Never   • Smokeless tobacco: Never   Vaping Use   • Vaping Use: Never used   Substance and Sexual Activity   • Alcohol use:  Yes     Alcohol/week: 7 0 standard drinks     Types: 7 Glasses of wine per week     Comment: daily  SOCIAL    • Drug use: Never   • Sexual activity: Not Currently     Partners: Male   Other Topics Concern   • None   Social History Narrative    EXERCISES REGULARLY     Social Determinants of Health     Financial Resource Strain: Not on file   Food Insecurity: Not on file   Transportation Needs: Not on file   Physical Activity: Not on file   Stress: Not on file   Social Connections: Not on file   Intimate Partner Violence: Not on file   Housing Stability: Not on file     Current Outpatient Medications on File Prior to Visit   Medication Sig   • amitriptyline (ELAVIL) 10 mg tablet TAKE 2 TABLETS BY MOUTH DAILY AT BEDTIME   • cetirizine (ZyrTEC) 10 mg tablet Take 10 mg by mouth daily   • hyoscyamine (ANASPAZ,LEVSIN) 0 125 MG tablet Take 0 125 mg by mouth if needed   • ondansetron (ZOFRAN-ODT) 4 mg disintegrating tablet DISSOLVE 1 TABLET ON TOP OF TONGUE EVERY 8 HOURS   • famotidine (PEPCID) 20 mg tablet Take 1 tablet (20 mg total) by mouth 2 (two) times a day (Patient not taking: Reported on 2/13/2023)     Allergies   Allergen Reactions   • Cephalexin GI Intolerance and Other (See Comments)     reports potential for cdiff which she had in the past and does not wish to take anything  that can potentially cause again   • Sulfamethoxazole-Trimethoprim Other (See Comments)     reports potential for cdiff which she had in the past and does not wish to take anything  that can potentially cause again   • Ciprofloxacin Nausea Only   • Flagyl [Metronidazole] Nausea Only   • Flexeril [Cyclobenzaprine]    • Hemophilus B Polysaccharide Vaccine Hives     Annotation - [id number]: local rxtn w N/V 9/14   • Onion - Food Allergy Hives   • Morphine Nausea Only     Immunization History   Administered Date(s) Administered   • COVID-19 PFIZER VACCINE 0 3 ML IM 03/23/2021, 04/03/2021, 09/20/2021, 04/03/2022   • INFLUENZA 09/06/2017, 10/03/2020, 10/20/2021, 10/19/2022   • Influenza Quadrivalent, 6-35 Months IM 09/06/2017   • Influenza, recombinant, quadrivalent,injectable, preservative free 09/20/2019   • Influenza, seasonal, injectable 11/06/2011, 09/23/2013, 09/23/2014   • Pneumococcal Conjugate 13-Valent 06/14/2021   • Tdap 11/17/2015       Objective     /68   Pulse 84   Temp (!) 96 6 °F (35 9 °C) (Temporal)   Resp 16   Ht 4' 10 66" (1 49 m)   Wt 57 2 kg (126 lb)   SpO2 96%   BMI 25 74 kg/m²     Physical Exam  HENT:      Right Ear: Tympanic membrane normal       Left Ear: Tympanic membrane normal       Nose: Rhinorrhea present  Mouth/Throat:      Mouth: Mucous membranes are moist    Cardiovascular:      Rate and Rhythm: Normal rate and regular rhythm  Pulmonary:      Effort: Pulmonary effort is normal    Lymphadenopathy:      Cervical: No cervical adenopathy  Neurological:      Mental Status: She is alert  Psychiatric:         Mood and Affect: Mood normal          Thought Content:  Thought content normal          Judgment: Judgment normal       Comments:  frustrated       Cathryn Heath,

## 2023-02-14 ENCOUNTER — VBI (OUTPATIENT)
Dept: ADMINISTRATIVE | Facility: OTHER | Age: 68
End: 2023-02-14

## 2023-03-10 ENCOUNTER — TELEPHONE (OUTPATIENT)
Dept: ADMINISTRATIVE | Facility: OTHER | Age: 68
End: 2023-03-10

## 2023-03-10 NOTE — TELEPHONE ENCOUNTER
----- Message from Michelle Jack sent at 3/8/2023  4:47 PM EST -----  Regarding: care gap request - mammo  03/08/23 4:47 PM    Hello, our patient attached above has had Mammogram completed/performed  Please assist in updating the patient chart by pulling the Care Everywhere (CE) document  The date of service is 03/06/2023       Thank you,  [facility]

## 2023-03-13 DIAGNOSIS — R05.2 SUBACUTE COUGH: ICD-10-CM

## 2023-03-13 DIAGNOSIS — J01.41 ACUTE RECURRENT PANSINUSITIS: ICD-10-CM

## 2023-03-13 RX ORDER — HYDROCODONE POLISTIREX AND CHLORPHENIRAMINE POLISTIREX 10; 8 MG/5ML; MG/5ML
5 SUSPENSION, EXTENDED RELEASE ORAL EVERY 12 HOURS PRN
Qty: 115 ML | Refills: 0 | Status: SHIPPED | OUTPATIENT
Start: 2023-03-13 | End: 2023-04-24

## 2023-03-13 NOTE — TELEPHONE ENCOUNTER
Upon review of the In Basket request we were able to note that no further action is required  The patient chart is up to date  Any additional questions or concerns should be emailed to the Practice Liaisons via the appropriate education email address, please do not reply via In Basket      Thank you  Rajendra Raphael

## 2023-04-20 ENCOUNTER — APPOINTMENT (OUTPATIENT)
Dept: RADIOLOGY | Facility: MEDICAL CENTER | Age: 68
End: 2023-04-20

## 2023-04-20 DIAGNOSIS — Z01.810 PRE-OPERATIVE CARDIOVASCULAR EXAMINATION: ICD-10-CM

## 2023-04-20 DIAGNOSIS — Z01.812 PRE-OPERATIVE LABORATORY EXAMINATION: ICD-10-CM

## 2023-04-24 ENCOUNTER — OFFICE VISIT (OUTPATIENT)
Dept: FAMILY MEDICINE CLINIC | Facility: CLINIC | Age: 68
End: 2023-04-24

## 2023-04-24 VITALS
WEIGHT: 122 LBS | DIASTOLIC BLOOD PRESSURE: 78 MMHG | BODY MASS INDEX: 25.61 KG/M2 | HEART RATE: 67 BPM | RESPIRATION RATE: 16 BRPM | TEMPERATURE: 97 F | SYSTOLIC BLOOD PRESSURE: 138 MMHG | HEIGHT: 58 IN | OXYGEN SATURATION: 99 %

## 2023-04-24 DIAGNOSIS — M17.11 PRIMARY OSTEOARTHRITIS OF RIGHT KNEE: ICD-10-CM

## 2023-04-24 DIAGNOSIS — Z01.818 PREOP GENERAL PHYSICAL EXAM: Primary | ICD-10-CM

## 2023-04-24 NOTE — PROGRESS NOTES
"  Chief Complaint   Patient presents with   • Pre-op Exam     pre op 05/02/2023, EKG, Dr Oliveira, [facility] [place], r total knee, pat's prior blwk, chest x ray we do ekg, anesthesia choice regional      Subjective: Leslie Marquez is a 78 y o  female who presents to the office today for a preoperative consultation at the request of surgeon DR Chandu Lambert who plans on performing [place] on May 2  Planned anesthesia is regional  The patient has the following known anesthesia issues: NA  Patient has a bleeding risk of: no recent abnormal bleeding  Review of Systems  Review of Systems   Respiratory: Negative  Sinus issue finally resolved   Gastrointestinal: Negative  Genitourinary: Negative for dysuria  Musculoskeletal: Positive for arthralgias (Symptomatic right knee-failed conservative treatment)  Psychiatric/Behavioral: Negative  Objective:      Physical Exam    /78   Pulse 67   Temp (!) 97 °F (36 1 °C) (Temporal)   Resp 16   Ht 4' 10 27\" (1 48 m)   Wt 55 3 kg (122 lb)   SpO2 99%   BMI 25 26 kg/m²     Physical Exam  Constitutional:       General: She is not in acute distress  Appearance: Normal appearance  She is well-developed and normal weight  HENT:      Right Ear: Tympanic membrane normal       Nose: Nose normal       Mouth/Throat:      Mouth: Mucous membranes are moist    Eyes:      Conjunctiva/sclera: Conjunctivae normal       Pupils: Pupils are equal, round, and reactive to light  Cardiovascular:      Rate and Rhythm: Normal rate and regular rhythm  No extrasystoles are present  Pulses: Normal pulses  Heart sounds: No murmur heard  Pulmonary:      Effort: Pulmonary effort is normal       Breath sounds: Normal breath sounds  Abdominal:      General: Bowel sounds are normal       Palpations: Abdomen is soft  There is no mass  Tenderness: There is no abdominal tenderness  Musculoskeletal:         General: Normal range of motion   " Comments: Antalgic gait   Neurological:      Mental Status: She is alert and oriented to person, place, and time  Deep Tendon Reflexes: Reflexes are normal and symmetric  Psychiatric:         Mood and Affect: Mood normal          Behavior: Behavior normal          Thought Content: Thought content normal          Judgment: Judgment normal             Predictors of intubation difficulty:  Morbid obesity? no  Anatomically abnormal facies? no  Short, thick neck? no  Neck range of motion: normal    Cardiographics  ECG: Sinus bradycardia with incomplete right bundle branch block  No changes from previous    Imaging  Chest x-ray: normal     Lab Review   Component      Latest Ref Rng & Units 4/5/2023   Hemoglobin      11 5 - 15 4 g/dL 12 7   HCT      34 8 - 46 1 % 38 4   MCV      82 - 98 fL 100 (H)   MCH      26 8 - 34 3 pg 33 0   MCHC      31 4 - 37 4 g/dL 33 1   RDW      11 6 - 15 1 % 13 3   MPV      8 9 - 12 7 fL 10 3   Platelet Count      882 - 390 Thousands/uL 282   nRBC      /100 WBCs 0   Neutrophils %      43 - 75 % 31 (L)   Immat GRANS %      0 - 2 % 0   Lymphocytes Relative      14 - 44 % 57 (H)   Monocytes Relative      4 - 12 % 10   Eosinophils      0 - 6 % 1   Basophils Relative      0 - 1 % 1   Absolute Neutrophils      1 85 - 7 62 Thousands/µL 1 34 (L)   Immature Grans Absolute      0 00 - 0 20 Thousand/uL 0 01   Lymphocytes Absolute      0 60 - 4 47 Thousands/µL 2 53   Absolute Monocytes      0 17 - 1 22 Thousand/µL 0 42   Absolute Eosinophils      0 00 - 0 61 Thousand/µL 0 04   Basophils Absolute      0 00 - 0 10 Thousands/µL 0 03   Sodium      135 - 147 mmol/L 139   Potassium      3 5 - 5 3 mmol/L 4 0   Chloride      96 - 108 mmol/L 107   CO2      21 - 32 mmol/L 28   Anion Gap      4 - 13 mmol/L 4   BUN      5 - 25 mg/dL 20   Creatinine      0 60 - 1 30 mg/dL 0 77   Glucose, Random      65 - 140 mg/dL 80   Calcium      8 3 - 10 1 mg/dL 9 0   AST      5 - 45 U/L 22   ALT      12 - 78 U/L 25 Alkaline Phosphatase      46 - 116 U/L 73   Total Protein      6 4 - 8 4 g/dL 7 0   Albumin      3 5 - 5 0 g/dL 3 8   TOTAL BILIRUBIN      0 20 - 1 00 mg/dL 0 42   eGFR      ml/min/1 73sq m 80   RBC, UA      None Seen, 1-2 /hpf 1-2   WBC, UA      None Seen, 1-2 /hpf 20-30 (A)   Epithelial Cells      None Seen, Occasional /hpf None Seen   Bacteria, UA      None Seen, Occasional /hpf Occasional   Hyaline Casts, UA      None Seen /lpf 0-3 (A)       If applicable, they were reviewed and listed below  Assessment:      78 y o  female with planned surgery as above  Known risk factors for perioperative complications: None    Difficulty with intubation is not anticipated  Can walk 2 blocks without difficulty:yes  Can walk up 2 flights of stairs without difficulty:yes     1  Preop general physical exam  POCT ECG      2  Primary osteoarthritis of right knee               Plan:          Patient is medically cleared for surgery         Cephalexin, Sulfamethoxazole-trimethoprim, Ciprofloxacin, Flagyl [metronidazole], Flexeril [cyclobenzaprine], Hemophilus b polysaccharide vaccine, Onion - food allergy, and Morphine  Past Medical History:   Diagnosis Date   • C  difficile diarrhea     resolved 06 jun 2017   • Depression    • Elevated CEA     resolved 06 sep 2017   • GERD (gastroesophageal reflux disease)    • Headache(784 0)    • Post herpetic neuralgia 9/18/2018   • Reactive airway disease     RESOLVED 06 NOV 2017   • Rotator cuff tear    • Shingles    • Sleep disorder     RESOLVED 06 NOV 2017   • Urinary tract infection      Past Surgical History:   Procedure Laterality Date   • BREAST BIOPSY     • COLONOSCOPY     • CYST REMOVAL  2022    2 cysts on head removed and biopsied   • DILATION AND CURETTAGE OF UTERUS     • FRACTURE SURGERY     • ROTATOR CUFF REPAIR     • TUBAL LIGATION     • WRIST FRACTURE SURGERY       Patient Active Problem List   Diagnosis   • Basal cell carcinoma of skin   • Breast disorder   • Functional diarrhea   • GERD (gastroesophageal reflux disease)   • Hypercholesterolemia   • Insomnia   • MDD (major depressive disorder), recurrent episode (HCC)   • Macrocytosis   • Microscopic colitis   • Migraine headache   • Postmenopausal   • RBBB   • Vulvar irritation   • Family history of malignant neoplasm of breast   • Family history of malignant neoplasm of ovary   • Anxiety   • Irritable bowel syndrome with diarrhea     Social History     Socioeconomic History   • Marital status: /Civil Union     Spouse name: None   • Number of children: None   • Years of education: None   • Highest education level: None   Occupational History   • 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   Tobacco Use   • Smoking status: Never   • Smokeless tobacco: Never   Vaping Use   • Vaping Use: Never used   Substance and Sexual Activity   • Alcohol use:  Yes     Alcohol/week: 7 0 standard drinks     Types: 7 Glasses of wine per week     Comment: daily  SOCIAL    • Drug use: Never   • Sexual activity: Not Currently     Partners: Male   Other Topics Concern   • None   Social History Narrative    EXERCISES REGULARLY     Social Determinants of Health     Financial Resource Strain: Not on file   Food Insecurity: Not on file   Transportation Needs: Not on file   Physical Activity: Not on file   Stress: Not on file   Social Connections: Not on file   Intimate Partner Violence: Not on file   Housing Stability: Not on file       Current Outpatient Medications:   •  amitriptyline (ELAVIL) 10 mg tablet, TAKE 2 TABLETS BY MOUTH DAILY AT BEDTIME, Disp: 180 tablet, Rfl: 1  •  cetirizine (ZyrTEC) 10 mg tablet, Take 10 mg by mouth daily, Disp: , Rfl:   •  famotidine (PEPCID) 20 mg tablet, Take 1 tablet (20 mg total) by mouth 2 (two) times a day, Disp: 60 tablet, Rfl: 3  •  hyoscyamine (ANASPAZ,LEVSIN) 0 125 MG tablet, Take 0 125 mg by mouth if needed, Disp: , Rfl:   •  ondansetron (ZOFRAN-ODT) 4 mg disintegrating tablet, DISSOLVE 1 TABLET ON TOP OF TONGUE EVERY 8 HOURS, Disp: , Rfl:   Lab Results   Component Value Date    WBC 4 37 04/05/2023    HGB 12 7 04/05/2023    HCT 38 4 04/05/2023     (H) 04/05/2023     04/05/2023     Lab Results   Component Value Date    K 4 0 04/05/2023     04/05/2023    CO2 28 04/05/2023    BUN 20 04/05/2023    CREATININE 0 77 04/05/2023    GLUF 85 03/18/2022    CALCIUM 9 0 04/05/2023    CORRECTEDCA 9 7 03/18/2022    AST 22 04/05/2023    ALT 25 04/05/2023    ALKPHOS 73 04/05/2023    EGFR 80 04/05/2023     No results found for: KAREN        [unfilled]    Current Outpatient Medications:   •  amitriptyline (ELAVIL) 10 mg tablet, TAKE 2 TABLETS BY MOUTH DAILY AT BEDTIME, Disp: 180 tablet, Rfl: 1  •  cetirizine (ZyrTEC) 10 mg tablet, Take 10 mg by mouth daily, Disp: , Rfl:   •  famotidine (PEPCID) 20 mg tablet, Take 1 tablet (20 mg total) by mouth 2 (two) times a day, Disp: 60 tablet, Rfl: 3  •  hyoscyamine (ANASPAZ,LEVSIN) 0 125 MG tablet, Take 0 125 mg by mouth if needed, Disp: , Rfl:   •  ondansetron (ZOFRAN-ODT) 4 mg disintegrating tablet, DISSOLVE 1 TABLET ON TOP OF TONGUE EVERY 8 HOURS, Disp: , Rfl:   Allergies   Allergen Reactions   • Cephalexin GI Intolerance and Other (See Comments)     reports potential for cdiff which she had in the past and does not wish to take anything  that can potentially cause again   • Sulfamethoxazole-Trimethoprim Other (See Comments)     reports potential for cdiff which she had in the past and does not wish to take anything  that can potentially cause again   • Ciprofloxacin Nausea Only   • Flagyl [Metronidazole] Nausea Only   • Flexeril [Cyclobenzaprine]    • Hemophilus B Polysaccharide Vaccine Hives     Annotation - [id number]: local rxtn w N/V 9/14   • Onion - Food Allergy Hives   • Morphine Nausea Only

## 2023-05-21 DIAGNOSIS — K21.9 GASTROESOPHAGEAL REFLUX DISEASE WITHOUT ESOPHAGITIS: ICD-10-CM

## 2023-05-22 RX ORDER — FAMOTIDINE 20 MG/1
TABLET, FILM COATED ORAL
Qty: 60 TABLET | Refills: 3 | Status: SHIPPED | OUTPATIENT
Start: 2023-05-22

## 2023-05-24 NOTE — TELEPHONE ENCOUNTER
02/14/23 9:18 AM     [employer] SmartForm used to document caregap status      Hazel Westbrook MA
no

## 2023-06-27 DIAGNOSIS — F51.04 PSYCHOPHYSIOLOGICAL INSOMNIA: ICD-10-CM

## 2023-06-27 DIAGNOSIS — R05.2 SUBACUTE COUGH: Primary | ICD-10-CM

## 2023-06-27 DIAGNOSIS — R05.1 ACUTE COUGH: ICD-10-CM

## 2023-06-27 RX ORDER — PREDNISONE 10 MG/1
TABLET ORAL
Qty: 21 TABLET | Refills: 0 | Status: SHIPPED | OUTPATIENT
Start: 2023-06-27 | End: 2023-08-28 | Stop reason: SDUPTHER

## 2023-06-27 RX ORDER — AMITRIPTYLINE HYDROCHLORIDE 10 MG/1
20 TABLET, FILM COATED ORAL
Qty: 180 TABLET | Refills: 0 | Status: SHIPPED | OUTPATIENT
Start: 2023-06-27

## 2023-06-27 RX ORDER — HYDROCODONE POLISTIREX AND CHLORPHENIRAMINE POLISTIREX 10; 8 MG/5ML; MG/5ML
5 SUSPENSION, EXTENDED RELEASE ORAL EVERY 12 HOURS PRN
Qty: 115 ML | Refills: 0 | Status: SHIPPED | OUTPATIENT
Start: 2023-06-27

## 2023-08-28 DIAGNOSIS — R05.1 ACUTE COUGH: ICD-10-CM

## 2023-08-28 DIAGNOSIS — G43.909 MIGRAINE WITHOUT STATUS MIGRAINOSUS, NOT INTRACTABLE, UNSPECIFIED MIGRAINE TYPE: Primary | ICD-10-CM

## 2023-08-28 RX ORDER — PREDNISONE 10 MG/1
TABLET ORAL
Qty: 21 TABLET | Refills: 0 | Status: SHIPPED | OUTPATIENT
Start: 2023-08-28

## 2023-09-04 DIAGNOSIS — K21.9 GASTROESOPHAGEAL REFLUX DISEASE WITHOUT ESOPHAGITIS: ICD-10-CM

## 2023-09-05 RX ORDER — FAMOTIDINE 20 MG/1
TABLET, FILM COATED ORAL
Qty: 60 TABLET | Refills: 3 | Status: SHIPPED | OUTPATIENT
Start: 2023-09-05

## 2023-09-05 NOTE — TELEPHONE ENCOUNTER
08/01/22 2:47 PM     Thank you for your request  Your request has been received, reviewed, and noted that it no longer requires attention  This message will now be completed      Thank you  Shantelle Turner Oxybutynin Counseling:  I discussed with the patient the risks of oxybutynin including but not limited to skin rash, drowsiness, dry mouth, difficulty urinating, and blurred vision.

## 2023-09-11 DIAGNOSIS — F51.04 PSYCHOPHYSIOLOGICAL INSOMNIA: ICD-10-CM

## 2023-09-11 RX ORDER — AMITRIPTYLINE HYDROCHLORIDE 10 MG/1
20 TABLET, FILM COATED ORAL
Qty: 180 TABLET | Refills: 0 | Status: SHIPPED | OUTPATIENT
Start: 2023-09-11

## 2023-10-12 ENCOUNTER — OFFICE VISIT (OUTPATIENT)
Dept: FAMILY MEDICINE CLINIC | Facility: CLINIC | Age: 68
End: 2023-10-12
Payer: MEDICARE

## 2023-10-12 VITALS
TEMPERATURE: 96.4 F | BODY MASS INDEX: 25.19 KG/M2 | HEART RATE: 79 BPM | WEIGHT: 120 LBS | HEIGHT: 58 IN | SYSTOLIC BLOOD PRESSURE: 126 MMHG | OXYGEN SATURATION: 98 % | DIASTOLIC BLOOD PRESSURE: 84 MMHG

## 2023-10-12 DIAGNOSIS — J06.9 ACUTE URI: Primary | ICD-10-CM

## 2023-10-12 DIAGNOSIS — R05.1 ACUTE COUGH: ICD-10-CM

## 2023-10-12 PROCEDURE — 99214 OFFICE O/P EST MOD 30 MIN: CPT | Performed by: NURSE PRACTITIONER

## 2023-10-12 RX ORDER — DOXYCYCLINE 100 MG/1
100 TABLET ORAL 2 TIMES DAILY
Qty: 14 TABLET | Refills: 0 | Status: SHIPPED | OUTPATIENT
Start: 2023-10-12 | End: 2023-10-19

## 2023-10-12 RX ORDER — HYDROCODONE POLISTIREX AND CHLORPHENIRAMINE POLISTIREX 10; 8 MG/5ML; MG/5ML
5 SUSPENSION, EXTENDED RELEASE ORAL
Qty: 70 ML | Refills: 0 | Status: SHIPPED | OUTPATIENT
Start: 2023-10-12

## 2023-10-12 NOTE — PROGRESS NOTES
Name: Vandana Conrad      : 1955      MRN: [id number]  Encounter Provider: MARLEY Dominguez  Encounter Date: 10/12/2023   Encounter department: [address] [place] [address]     1. Acute URI  -     doxycycline (ADOXA) 100 MG tablet; Take 1 tablet (100 mg total) by mouth 2 (two) times a day for 7 days    2. Acute cough  -     doxycycline (ADOXA) 100 MG tablet; Take 1 tablet (100 mg total) by mouth 2 (two) times a day for 7 days  -     Hydrocod Jan-Chlorphe Jan ER (TUSSIONEX) 10-8 mg/5 mL ER suspension; Take 5 mL by mouth daily at bedtime as needed for cough Max Daily Amount: 5 mL    Start antibiotic, this is doxycycline 100 mg twice daily for 7 days. Take antibiotic with food. If you take any vitamin supplements, try to separate dose by 4-6 hours as this may impair absorption of the antibiotic. Can use cough syrup as needed. This medication may cause drowsiness. Do not drive on this medication. Do not drink alcohol while taking this medication. Do not mix with other medications that may cause drowsiness. Please call the office if you are experiencing any worsening of symptoms or no symptom improvement. If no improvement, may require imaging and/or inhaler. Subjective      Patient presents with:  Cough: Cough x 2 weeks   Started as sore throat x 1 day   No fevers. Not short of breath. Cough initially was productive but now more dry. Did have sick contacts. No wheezing. Still having a lot of fatigue and runny nose. Symptoms are worse in the morning. She is up from coughing. Sleeping elevated to help. Taking tylenol for the symptoms. Similar to cough she had in . Review of Systems   Constitutional:  Positive for fatigue. Negative for chills and fever. HENT:  Positive for rhinorrhea. Negative for postnasal drip, sinus pressure, sinus pain and sore throat. Eyes:  Negative for discharge. Respiratory:  Positive for cough.  Negative for shortness of breath. Cardiovascular:  Negative for chest pain. Gastrointestinal:  Negative for constipation and diarrhea. Genitourinary:  Negative for difficulty urinating. Musculoskeletal:  Negative for joint swelling. Skin:  Negative for rash. Neurological:  Negative for headaches. Hematological:  Negative for adenopathy. Psychiatric/Behavioral:  The patient is not nervous/anxious. Current Outpatient Medications on File Prior to Visit   Medication Sig    amitriptyline (ELAVIL) 10 mg tablet TAKE 2 TABLETS BY MOUTH EVERY DAY AT BEDTIME    cetirizine (ZyrTEC) 10 mg tablet Take 10 mg by mouth daily    famotidine (PEPCID) 20 mg tablet TAKE 1 TABLET BY MOUTH TWO TIMES DAILY    hyoscyamine (ANASPAZ,LEVSIN) 0.125 MG tablet Take 0.125 mg by mouth if needed    ondansetron (ZOFRAN-ODT) 4 mg disintegrating tablet DISSOLVE 1 TABLET ON TOP OF TONGUE EVERY 8 HOURS    [DISCONTINUED] Hydrocod Jan-Chlorphe Jan ER (TUSSIONEX) 10-8 mg/5 mL ER suspension Take 5 mL by mouth every 12 (twelve) hours as needed for cough Max Daily Amount: 10 mL    [DISCONTINUED] predniSONE 10 mg tablet 8-4-3-3-2-11 (Patient not taking: Reported on 10/12/2023)       Objective     /84 (BP Location: Left arm, Patient Position: Sitting, Cuff Size: Adult)   Pulse 79   Temp (!) 96.4 °F (35.8 °C) (Temporal)   Ht 4' 10" (1.473 m)   Wt 54.4 kg (120 lb)   SpO2 98%   BMI 25.08 kg/m²     Physical Exam  Vitals and nursing note reviewed. Constitutional:       General: She is not in acute distress. Appearance: Normal appearance. She is well-developed. She is not diaphoretic. HENT:      Head: Normocephalic and atraumatic. Right Ear: Tympanic membrane, ear canal and external ear normal. There is no impacted cerumen. Left Ear: Tympanic membrane, ear canal and external ear normal. There is no impacted cerumen. Nose: Nose normal.      Mouth/Throat:      Mouth: Mucous membranes are moist.      Pharynx: Oropharynx is clear.  No oropharyngeal exudate or posterior oropharyngeal erythema. Eyes:      General: Lids are normal.         Right eye: No discharge. Left eye: No discharge. Conjunctiva/sclera: Conjunctivae normal.   Cardiovascular:      Rate and Rhythm: Normal rate and regular rhythm. Heart sounds: No murmur heard. Pulmonary:      Effort: Pulmonary effort is normal. No respiratory distress. Breath sounds: Normal breath sounds. No wheezing. Musculoskeletal:         General: No deformity. Skin:     General: Skin is warm and dry. Neurological:      General: No focal deficit present. Mental Status: She is alert and oriented to person, place, and time. Psychiatric:         Speech: Speech normal.         Behavior: Behavior normal.         Thought Content:  Thought content normal.         Judgment: Judgment normal.       MARLEY Handley

## 2023-10-12 NOTE — PATIENT INSTRUCTIONS
Start antibiotic, this is doxycycline 100 mg twice daily for 7 days. Take antibiotic with food. If you take any vitamin supplements, try to separate dose by 4-6 hours as this may impair absorption of the antibiotic. Can use cough syrup as needed. This medication may cause drowsiness. Do not drive on this medication. Do not drink alcohol while taking this medication. Do not mix with other medications that may cause drowsiness. Please call the office if you are experiencing any worsening of symptoms or no symptom improvement.

## 2023-10-23 DIAGNOSIS — E55.9 VITAMIN D DEFICIENCY: ICD-10-CM

## 2023-10-23 DIAGNOSIS — E53.8 VITAMIN B12 DEFICIENCY: ICD-10-CM
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
## 2023-10-23 DIAGNOSIS — R79.9 ABNORMAL FINDING OF BLOOD CHEMISTRY, UNSPECIFIED: ICD-10-CM

## 2023-10-23 DIAGNOSIS — R53.81 MALAISE AND FATIGUE: ICD-10-CM

## 2023-10-23 DIAGNOSIS — R53.83 MALAISE AND FATIGUE: ICD-10-CM

## 2023-10-31 ENCOUNTER — RA CDI HCC (OUTPATIENT)
Dept: OTHER | Facility: HOSPITAL | Age: 68
End: 2023-10-31

## 2023-10-31 NOTE — PROGRESS NOTES
[address] [facility] coding opportunities       Chart reviewed, no opportunity found: CHART REVIEWED, NO OPPORTUNITY FOUND        Patients Insurance     Medicare Insurance: Medicare Pt transferred to  at [facility]

## 2023-11-01 ENCOUNTER — OFFICE VISIT (OUTPATIENT)
Dept: FAMILY MEDICINE CLINIC | Facility: CLINIC | Age: 68
End: 2023-11-01
Payer: MEDICARE

## 2023-11-01 VITALS
TEMPERATURE: 97 F | BODY MASS INDEX: 26.41 KG/M2 | HEIGHT: 58 IN | HEART RATE: 81 BPM | SYSTOLIC BLOOD PRESSURE: 102 MMHG | DIASTOLIC BLOOD PRESSURE: 62 MMHG | OXYGEN SATURATION: 97 % | WEIGHT: 125.8 LBS

## 2023-11-01 DIAGNOSIS — S62.616D CLOSED DISPLACED FRACTURE OF PROXIMAL PHALANX OF RIGHT LITTLE FINGER WITH ROUTINE HEALING, SUBSEQUENT ENCOUNTER: ICD-10-CM

## 2023-11-01 DIAGNOSIS — F51.04 PSYCHOPHYSIOLOGICAL INSOMNIA: ICD-10-CM

## 2023-11-01 DIAGNOSIS — Z00.00 MEDICARE ANNUAL WELLNESS VISIT, SUBSEQUENT: Primary | ICD-10-CM

## 2023-11-01 PROCEDURE — G0439 PPPS, SUBSEQ VISIT: HCPCS | Performed by: FAMILY MEDICINE

## 2023-11-01 NOTE — PATIENT INSTRUCTIONS
Medicare Preventive Visit Patient Instructions  Thank you for completing your Welcome to Medicare Visit or Medicare Annual Wellness Visit today. Your next wellness visit will be due in one year (11/1/2024). The screening/preventive services that you may require over the next 5-10 years are detailed below. Some tests may not apply to you based off risk factors and/or age. Screening tests ordered at today's visit but not completed yet may show as past due. Also, please note that scanned in results may not display below. Preventive Screenings:  Service Recommendations Previous Testing/Comments   Colorectal Cancer Screening  * Colonoscopy    * Fecal Occult Blood Test (FOBT)/Fecal Immunochemical Test (FIT)  * Fecal DNA/Cologuard Test  * Flexible Sigmoidoscopy Age: 43-73 years old   Colonoscopy: every 10 years (may be performed more frequently if at higher risk)  OR  FOBT/FIT: every 1 year  OR  Cologuard: every 3 years  OR  Sigmoidoscopy: every 5 years  Screening may be recommended earlier than age 39 if at higher risk for colorectal cancer. Also, an individualized decision between you and your healthcare provider will decide whether screening between the ages of 77-80 would be appropriate. Colonoscopy: 01/12/2017  FOBT/FIT: Not on file  Cologuard: Not on file  Sigmoidoscopy: Not on file    Screening Current     Breast Cancer Screening Age: 36 years old  Frequency: every 1-2 years  Not required if history of left and right mastectomy Mammogram: 03/06/2023    Screening Current   Cervical Cancer Screening Between the ages of 21-29, pap smear recommended once every 3 years. Between the ages of 32-69, can perform pap smear with HPV co-testing every 5 years.    Recommendations may differ for women with a history of total hysterectomy, cervical cancer, or abnormal pap smears in past. Pap Smear: 04/15/2021    Screening Not Indicated   Hepatitis C Screening Once for adults born between 1945 and 1965  More frequently in patients at high risk for Hepatitis C Hep C Antibody: 08/02/2018    Screening Current   Diabetes Screening 1-2 times per year if you're at risk for diabetes or have pre-diabetes Fasting glucose: 85 mg/dL (3/18/2022)  A1C: No results in last 5 years (No results in last 5 years)  Screening Current   Cholesterol Screening Once every 5 years if you don't have a lipid disorder. May order more often based on risk factors. Lipid panel: 03/18/2022    Screening Not Indicated  History Lipid Disorder     Other Preventive Screenings Covered by Medicare:  Abdominal Aortic Aneurysm (AAA) Screening: covered once if your at risk. You're considered to be at risk if you have a family history of AAA. Lung Cancer Screening: covers low dose CT scan once per year if you meet all of the following conditions: (1) Age 48-67; (2) No signs or symptoms of lung cancer; (3) Current smoker or have quit smoking within the last 15 years; (4) You have a tobacco smoking history of at least 20 pack years (packs per day multiplied by number of years you smoked); (5) You get a written order from a healthcare provider. Glaucoma Screening: covered annually if you're considered high risk: (1) You have diabetes OR (2) Family history of glaucoma OR (3)  aged 48 and older OR (3)  American aged 72 and older  Osteoporosis Screening: covered every 2 years if you meet one of the following conditions: (1) You're estrogen deficient and at risk for osteoporosis based off medical history and other findings; (2) Have a vertebral abnormality; (3) On glucocorticoid therapy for more than 3 months; (4) Have primary hyperparathyroidism; (5) On osteoporosis medications and need to assess response to drug therapy. Last bone density test (DXA Scan): 04/23/2019. HIV Screening: covered annually if you're between the age of 14-79. Also covered annually if you are younger than 13 and older than 72 with risk factors for HIV infection.  For pregnant patients, it is covered up to 3 times per pregnancy. Immunizations:  Immunization Recommendations   Influenza Vaccine Annual influenza vaccination during flu season is recommended for all persons aged >= 6 months who do not have contraindications   Pneumococcal Vaccine   * Pneumococcal conjugate vaccine = PCV13 (Prevnar 13), PCV15 (Vaxneuvance), PCV20 (Prevnar 20)  * Pneumococcal polysaccharide vaccine = PPSV23 (Pneumovax) Adults 44-12 yo with certain risk factors or if 69+ yo  If never received any pneumonia vaccine: recommend Prevnar 20 (PCV20)  Give PCV20 if previously received 1 dose of PCV13 or PPSV23   Hepatitis B Vaccine 3 dose series if at intermediate or high risk (ex: diabetes, end stage renal disease, liver disease)   Respiratory syncytial virus (RSV) Vaccine - COVERED BY MEDICARE PART D  * RSVPreF3 (Arexvy) CDC recommends that adults 61years of age and older may receive a single dose of RSV vaccine using shared clinical decision-making (SCDM)   Tetanus (Td) Vaccine - COST NOT COVERED BY MEDICARE PART B Following completion of primary series, a booster dose should be given every 10 years to maintain immunity against tetanus. Td may also be given as tetanus wound prophylaxis. Tdap Vaccine - COST NOT COVERED BY MEDICARE PART B Recommended at least once for all adults. For pregnant patients, recommended with each pregnancy.    Shingles Vaccine (Shingrix) - COST NOT COVERED BY MEDICARE PART B  2 shot series recommended in those 19 years and older who have or will have weakened immune systems or those 50 years and older     Health Maintenance Due:      Topic Date Due   • Breast Cancer Screening: Mammogram  03/06/2024   • Cervical Cancer Screening  04/15/2026   • Colorectal Cancer Screening  01/12/2027   • Hepatitis C Screening  Completed     Immunizations Due:      Topic Date Due   • Pneumococcal Vaccine: 65+ Years (2 - PPSV23 if available, else PCV20) 06/14/2022   • Influenza Vaccine (1) 09/01/2023 Advance Directives   What are advance directives? Advance directives are legal documents that state your wishes and plans for medical care. These plans are made ahead of time in case you lose your ability to make decisions for yourself. Advance directives can apply to any medical decision, such as the treatments you want, and if you want to donate organs. What are the types of advance directives? There are many types of advance directives, and each state has rules about how to use them. You may choose a combination of any of the following:  Living will: This is a written record of the treatment you want. You can also choose which treatments you do not want, which to limit, and which to stop at a certain time. This includes surgery, medicine, IV fluid, and tube feedings. Durable power of  for [facility]): This is a written record that states who you want to make healthcare choices for you when you are unable to make them for yourself. This person, called a proxy, is usually a family member or a friend. You may choose more than 1 proxy. Do not resuscitate (DNR) order:  A DNR order is used in case your heart stops beating or you stop breathing. It is a request not to have certain forms of treatment, such as CPR. A DNR order may be included in other types of advance directives. Medical directive: This covers the care that you want if you are in a coma, near death, or unable to make decisions for yourself. You can list the treatments you want for each condition. Treatment may include pain medicine, surgery, blood transfusions, dialysis, IV or tube feedings, and a ventilator (breathing machine). Values history: This document has questions about your views, beliefs, and how you feel and think about life. This information can help others choose the care that you would choose. Why are advance directives important? An advance directive helps you control your care.  Although spoken wishes may be used, it is better to have your wishes written down. Spoken wishes can be misunderstood, or not followed. Treatments may be given even if you do not want them. An advance directive may make it easier for your family to make difficult choices about your care. Fall Prevention    Fall prevention  includes ways to make your home and other areas safer. It also includes ways you can move more carefully to prevent a fall. Health conditions that cause changes in your blood pressure, vision, or muscle strength and coordination may increase your risk for falls. Medicines may also increase your risk for falls if they make you dizzy, weak, or sleepy. Fall prevention tips:   Stand or sit up slowly. Use assistive devices as directed. Wear shoes that fit well and have soles that . Wear a personal alarm. Stay active. Manage your medical conditions. Home Safety Tips:  Add items to prevent falls in the bathroom. Keep paths clear. Install bright lights in your home. Keep items you use often on shelves within reach. Paint or place reflective tape on the edges of your stairs. Weight Management   Why it is important to manage your weight:  Being overweight increases your risk of health conditions such as heart disease, high blood pressure, type 2 diabetes, and certain types of cancer. It can also increase your risk for osteoarthritis, sleep apnea, and other respiratory problems. Aim for a slow, steady weight loss. Even a small amount of weight loss can lower your risk of health problems. How to lose weight safely:  A safe and healthy way to lose weight is to eat fewer calories and get regular exercise. You can lose up about 1 pound a week by decreasing the number of calories you eat by 500 calories each day. Healthy meal plan for weight management:  A healthy meal plan includes a variety of foods, contains fewer calories, and helps you stay healthy.  A healthy meal plan includes the following:  Eat whole-grain foods more often. A healthy meal plan should contain fiber. Fiber is the part of grains, fruits, and vegetables that is not broken down by your body. Whole-grain foods are healthy and provide extra fiber in your diet. Some examples of whole-grain foods are whole-wheat breads and pastas, oatmeal, brown rice, and bulgur. Eat a variety of vegetables every day. Include dark, leafy greens such as spinach, kale, yoel greens, and mustard greens. Eat yellow and orange vegetables such as carrots, sweet potatoes, and winter squash. Eat a variety of fruits every day. Choose fresh or canned fruit (canned in its own juice or light syrup) instead of juice. Fruit juice has very little or no fiber. Eat low-fat dairy foods. Drink fat-free (skim) milk or 1% milk. Eat fat-free yogurt and low-fat cottage cheese. Try low-fat cheeses such as mozzarella and other reduced-fat cheeses. Choose meat and other protein foods that are low in fat. Choose beans or other legumes such as split peas or lentils. Choose fish, skinless poultry (chicken or turkey), or lean cuts of red meat (beef or pork). Before you cook meat or poultry, cut off any visible fat. Use less fat and oil. Try baking foods instead of frying them. Add less fat, such as margarine, sour cream, regular salad dressing and mayonnaise to foods. Eat fewer high-fat foods. Some examples of high-fat foods include french fries, doughnuts, ice cream, and cakes. Eat fewer sweets. Limit foods and drinks that are high in sugar. This includes candy, cookies, regular soda, and sweetened drinks. Exercise:  Exercise at least 30 minutes per day on most days of the week. Some examples of exercise include walking, biking, dancing, and swimming. You can also fit in more physical activity by taking the stairs instead of the elevator or parking farther away from stores. Ask your healthcare provider about the best exercise plan for you.    Alcohol Use and Your Health    Drinking too much can harm your health. Excessive alcohol use leads to about 88,000 death in [facility] each year, and shortens the life of those who diet by almost 30 years. Further, excessive drinking cost the economy $249 billion in 2010. Most excessive drinkers are not alcohol dependent. Excessive alcohol use has immediate effects that increase the risk of many harmful health conditions. These are most often the result of binge drinking. Over time, excessive alcohol use can lead to the development of chronic diseases and other series health problems. What is considered a "drink"? Excessive alcohol use includes:  Binge Drinking: For women, 4 or more drinks consumed on one occasion. For men, 5 or more drinks consumed on one occasion. Heavy Drinking: For women, 8 or more drinks per week. For men, 15 or more drinks per week  Any alcohol used by pregnant women  Any alcohol used by those under the age of 21 years    If you choose to drink, do so in moderation:  Do not drink at all if you are under the age of 24, or if you are or may be pregnant, or have health problems that could be made worse by drinking.   For women, up to 1 drink per day  For men, up to 2 drinks a day    No one should begin drinking or drink more frequently based on potential health benefits    Short-Term Health Risks:  Injuries: motor vehicle crashes, falls, drownings, burns  Violence: homicide, suicide, sexual assault, intimate partner violence  Alcohol poisoning  Reproductive health: risky sexual behaviors, unintended prengnacy, sexually transmitted diseases, miscarriage, stillbirth, fetal alcohol syndrome    Long-Term Health Risks:  Chronic diseases: high blood pressure, heart disease, stroke, liver disease, digestive problems  Cancers: breast, mouth and throat, liver, colon  Learning and memory problems: dementia, poor school performance  Mental health: depression, anxiety, insomnia  Social problems: lost productivity, family problems, unemployment  Alcohol dependence    For support and more information:  Substance Abuse and [address] , [address] [place] [address]  Web Address: https://[employer]/    Alcoholics Anonymous        Web Address: http://www.anguiano.info/    https://www.cdc.gov/alcohol/fact-sheets/alcohol-use.htm     © [employer] 2018 Information is for End User's use only and may not be sold, redistributed or otherwise used for commercial purposes.  All illustrations and images included in CareNotes® are the copyrighted property of [employer] or [address] [place] [address]
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
## 2023-11-03 PROBLEM — S52.539A CLOSED COLLES' FRACTURE: Status: ACTIVE | Noted: 2023-11-03

## 2023-11-03 PROBLEM — Z96.651 HISTORY OF TOTAL RIGHT KNEE REPLACEMENT: Status: ACTIVE | Noted: 2023-08-15

## 2023-11-03 PROBLEM — M17.12 OSTEOARTHRITIS OF LEFT KNEE: Status: ACTIVE | Noted: 2023-08-15

## 2024-01-14 DIAGNOSIS — K21.9 GASTROESOPHAGEAL REFLUX DISEASE WITHOUT ESOPHAGITIS: ICD-10-CM

## 2024-01-15 ENCOUNTER — NURSE TRIAGE (OUTPATIENT)
Age: 69
End: 2024-01-15

## 2024-01-15 RX ORDER — FAMOTIDINE 20 MG/1
20 TABLET, FILM COATED ORAL 2 TIMES DAILY
Qty: 60 TABLET | Refills: 5 | Status: SHIPPED | OUTPATIENT
Start: 2024-01-15

## 2024-01-15 NOTE — TELEPHONE ENCOUNTER
"Patient reports sudden onset of diarrhea. Patient denies any fever, N/V or other s/sx of illness. Patient does report abdominal pain. Patient is given home care information with understanding verbalized. Patient is also scheduled for an OV with Dr. Alvarado on 1/16/24.   Reason for Disposition   MILD diarrhea (e.g., 1-3 or more stools than normal in past 24 hours) diarrhea without known cause and present > 7 days    Answer Assessment - Initial Assessment Questions  1. DIARRHEA SEVERITY: \"How bad is the diarrhea?\" \"How many extra stools have you had in the past 24 hours than normal?\"     - NO DIARRHEA (SCALE 0)    - MILD (SCALE 1-3): Few loose or mushy BMs; increase of 1-3 stools over normal daily number of stools; mild increase in ostomy output.    -  MODERATE (SCALE 4-7): Increase of 4-6 stools daily over normal; moderate increase in ostomy output.  * SEVERE (SCALE 8-10; OR 'WORST POSSIBLE'): Increase of 7 or more stools daily over normal; moderate increase in ostomy output; incontinence.      0 episodes today: 6 episodes  2. ONSET: \"When did the diarrhea begin?\"      Few days  3. BM CONSISTENCY: \"How loose or watery is the diarrhea?\"       Loose and then watery  4. VOMITING: \"Are you also vomiting?\" If Yes, ask: \"How many times in the past 24 hours?\"       no  5. ABDOMINAL PAIN: \"Are you having any abdominal pain?\" If Yes, ask: \"What does it feel like?\" (e.g., crampy, dull, intermittent, constant)       yes  6. ABDOMINAL PAIN SEVERITY: If present, ask: \"How bad is the pain?\"  (e.g., Scale 1-10; mild, moderate, or severe)    - MILD (1-3): doesn't interfere with normal activities, abdomen soft and not tender to touch     - MODERATE (4-7): interferes with normal activities or awakens from sleep, tender to touch     - SEVERE (8-10): excruciating pain, doubled over, unable to do any normal activities        moderate  7. ORAL INTAKE: If vomiting, \"Have you been able to drink liquids?\" \"How much fluids have you had in " "the past 24 hours?\"      Patient has not had any food intake but has been drinking 6 glasses water  8. HYDRATION: \"Any signs of dehydration?\" (e.g., dry mouth [not just dry lips], too weak to stand, dizziness, new weight loss) \"When did you last urinate?\"      Patient feels weak  9. EXPOSURE: \"Have you traveled to a foreign country recently?\" \"Have you been exposed to anyone with diarrhea?\" \"Could you have eaten any food that was spoiled?\"      No  10. ANTIBIOTIC USE: \"Are you taking antibiotics now or have you taken antibiotics in the past 2 months?\"        no  11. OTHER SYMPTOMS: \"Do you have any other symptoms?\" (e.g., fever, blood in stool)        no  12. PREGNANCY: \"Is there any chance you are pregnant?\" \"When was your last menstrual period?\"        N/A    Protocols used: Diarrhea-ADULT-[place]    "

## 2024-03-04 DIAGNOSIS — F51.04 PSYCHOPHYSIOLOGICAL INSOMNIA: ICD-10-CM

## 2024-03-04 RX ORDER — AMITRIPTYLINE HYDROCHLORIDE 10 MG/1
20 TABLET, FILM COATED ORAL
Qty: 180 TABLET | Refills: 1 | Status: SHIPPED | OUTPATIENT
Start: 2024-03-04

## 2024-04-10 ENCOUNTER — LAB (OUTPATIENT)
Dept: LAB | Facility: CLINIC | Age: 69
End: 2024-04-10
Payer: MEDICARE

## 2024-04-10 DIAGNOSIS — R53.83 MALAISE AND FATIGUE: ICD-10-CM

## 2024-04-10 DIAGNOSIS — E53.8 VITAMIN B12 DEFICIENCY: ICD-10-CM

## 2024-04-10 DIAGNOSIS — E55.9 VITAMIN D DEFICIENCY: ICD-10-CM
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
## 2024-04-10 DIAGNOSIS — R53.81 MALAISE AND FATIGUE: ICD-10-CM

## 2024-04-10 DIAGNOSIS — R79.9 ABNORMAL FINDING OF BLOOD CHEMISTRY, UNSPECIFIED: ICD-10-CM

## 2024-04-10 LAB
25(OH)D3 SERPL-MCNC: 52.5 NG/ML (ref 30–100)
B BURGDOR IGG+IGM SER QL IA: NEGATIVE
ERYTHROCYTE [DISTWIDTH] IN BLOOD BY AUTOMATED COUNT: 13.2 % (ref 11.6–15.1)
FERRITIN SERPL-MCNC: 49 NG/ML (ref 11–307)
HCT VFR BLD AUTO: 36 % (ref 34.8–46.1)
HGB BLD-MCNC: 13.5 G/DL (ref 11.5–15.4)
IRON SATN MFR SERPL: 53 % (ref 15–50)
IRON SERPL-MCNC: 156 UG/DL (ref 50–212)
MCH RBC QN AUTO: 39.1 PG (ref 26.8–34.3)
MCHC RBC AUTO-ENTMCNC: 37.5 G/DL (ref 31.4–37.4)
MCV RBC AUTO: 104 FL (ref 82–98)
PLATELET # BLD AUTO: 234 THOUSANDS/UL (ref 149–390)
PMV BLD AUTO: 10.3 FL (ref 8.9–12.7)
RBC # BLD AUTO: 3.45 MILLION/UL (ref 3.81–5.12)
TIBC SERPL-MCNC: 294 UG/DL (ref 250–450)
UIBC SERPL-MCNC: 138 UG/DL (ref 155–355)
VIT B12 SERPL-MCNC: 237 PG/ML (ref 180–914)
WBC # BLD AUTO: 4.69 THOUSAND/UL (ref 4.31–10.16)

## 2024-04-10 PROCEDURE — 83540 ASSAY OF IRON: CPT

## 2024-04-10 PROCEDURE — 36415 COLL VENOUS BLD VENIPUNCTURE: CPT

## 2024-04-10 PROCEDURE — 83550 IRON BINDING TEST: CPT

## 2024-04-10 PROCEDURE — 86618 LYME DISEASE ANTIBODY: CPT

## 2024-04-10 PROCEDURE — 85027 COMPLETE CBC AUTOMATED: CPT

## 2024-04-10 PROCEDURE — 82607 VITAMIN B-12: CPT

## 2024-04-10 PROCEDURE — 82306 VITAMIN D 25 HYDROXY: CPT

## 2024-04-10 PROCEDURE — 82728 ASSAY OF FERRITIN: CPT

## 2024-04-16 ENCOUNTER — TELEMEDICINE (OUTPATIENT)
Dept: FAMILY MEDICINE CLINIC | Facility: CLINIC | Age: 69
End: 2024-04-16
Payer: MEDICARE

## 2024-04-16 DIAGNOSIS — F51.01 PRIMARY INSOMNIA: Primary | ICD-10-CM

## 2024-04-16 DIAGNOSIS — F43.9 STRESS AT HOME: ICD-10-CM

## 2024-04-16 DIAGNOSIS — Z86.59 HISTORY OF DEPRESSION: ICD-10-CM

## 2024-04-16 DIAGNOSIS — F41.9 ANXIETY: ICD-10-CM

## 2024-04-16 PROCEDURE — G2211 COMPLEX E/M VISIT ADD ON: HCPCS | Performed by: FAMILY MEDICINE

## 2024-04-16 PROCEDURE — 99213 OFFICE O/P EST LOW 20 MIN: CPT | Performed by: FAMILY MEDICINE

## 2024-04-16 RX ORDER — TRAZODONE HYDROCHLORIDE 50 MG/1
50 TABLET ORAL
Qty: 30 TABLET | Refills: 1 | Status: SHIPPED | OUTPATIENT
Start: 2024-04-16 | End: 2024-04-22 | Stop reason: SDUPTHER
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
## 2024-04-22 DIAGNOSIS — F51.01 PRIMARY INSOMNIA: ICD-10-CM

## 2024-04-22 DIAGNOSIS — Z86.59 HISTORY OF DEPRESSION: ICD-10-CM

## 2024-04-22 DIAGNOSIS — F41.9 ANXIETY: ICD-10-CM

## 2024-04-22 RX ORDER — TRAZODONE HYDROCHLORIDE 50 MG/1
75 TABLET ORAL
Qty: 60 TABLET | Refills: 0 | Status: SHIPPED | OUTPATIENT
Start: 2024-04-22 | End: 2024-05-09

## 2024-05-09 DIAGNOSIS — F51.01 PRIMARY INSOMNIA: ICD-10-CM

## 2024-05-09 DIAGNOSIS — Z86.59 HISTORY OF DEPRESSION: ICD-10-CM

## 2024-05-09 DIAGNOSIS — F41.9 ANXIETY: ICD-10-CM

## 2024-05-09 RX ORDER — TRAZODONE HYDROCHLORIDE 50 MG/1
TABLET ORAL
Qty: 60 TABLET | Refills: 0 | Status: SHIPPED | OUTPATIENT
Start: 2024-05-09

## 2024-06-07 DIAGNOSIS — F51.01 PRIMARY INSOMNIA: ICD-10-CM

## 2024-06-07 DIAGNOSIS — Z86.59 HISTORY OF DEPRESSION: ICD-10-CM

## 2024-06-07 DIAGNOSIS — F41.9 ANXIETY: ICD-10-CM

## 2024-06-07 RX ORDER — TRAZODONE HYDROCHLORIDE 50 MG/1
TABLET ORAL
Qty: 180 TABLET | Refills: 1 | Status: SHIPPED | OUTPATIENT
Start: 2024-06-07

## 2024-06-17 DIAGNOSIS — K59.89 VISCERAL HYPERSENSITIVITY SYNDROME: Primary | ICD-10-CM

## 2024-06-17 DIAGNOSIS — K59.9 FUNCTIONAL INTESTINAL DISORDER: ICD-10-CM

## 2024-06-26 ENCOUNTER — PREP FOR PROCEDURE (OUTPATIENT)
Dept: GASTROENTEROLOGY | Facility: MEDICAL CENTER | Age: 69
End: 2024-06-26

## 2024-06-26 ENCOUNTER — CONSULT (OUTPATIENT)
Dept: GASTROENTEROLOGY | Facility: MEDICAL CENTER | Age: 69
End: 2024-06-26
Payer: MEDICARE

## 2024-06-26 ENCOUNTER — TELEPHONE (OUTPATIENT)
Dept: GASTROENTEROLOGY | Facility: MEDICAL CENTER | Age: 69
End: 2024-06-26

## 2024-06-26 VITALS
HEIGHT: 58 IN | BODY MASS INDEX: 25.86 KG/M2 | WEIGHT: 123.2 LBS | TEMPERATURE: 98.6 F | DIASTOLIC BLOOD PRESSURE: 78 MMHG | SYSTOLIC BLOOD PRESSURE: 136 MMHG | OXYGEN SATURATION: 98 % | HEART RATE: 67 BPM

## 2024-06-26 DIAGNOSIS — K59.89 VISCERAL HYPERSENSITIVITY SYNDROME: ICD-10-CM

## 2024-06-26 DIAGNOSIS — K52.9 CHRONIC DIARRHEA: Primary | ICD-10-CM

## 2024-06-26 DIAGNOSIS — F51.01 PRIMARY INSOMNIA: ICD-10-CM

## 2024-06-26 DIAGNOSIS — R10.13 EPIGASTRIC PAIN: ICD-10-CM

## 2024-06-26 DIAGNOSIS — R63.4 UNINTENTIONAL WEIGHT LOSS: ICD-10-CM

## 2024-06-26 DIAGNOSIS — F41.9 ANXIETY: ICD-10-CM

## 2024-06-26 DIAGNOSIS — Z86.59 HISTORY OF DEPRESSION: ICD-10-CM

## 2024-06-26 DIAGNOSIS — K59.9 FUNCTIONAL INTESTINAL DISORDER: ICD-10-CM

## 2024-06-26 PROCEDURE — 99204 OFFICE O/P NEW MOD 45 MIN: CPT | Performed by: STUDENT IN AN ORGANIZED HEALTH CARE EDUCATION/TRAINING PROGRAM

## 2024-06-26 NOTE — PROGRESS NOTES
[facility] - Outpatient Consultation  Renee Harkins 69 y.o. female MRN: [id number]  Encounter: [id number]          ASSESSMENT AND PLAN:    69-year-old female with anxiety, depression, insomnia, migraines here to establish care for longstanding GI issues.  She has a long history of chronic upper abdominal pain and diarrhea.  On review, it appears she has had benefit from several courses of empiric fidaxomicin for C. difficile positivity.  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  She reports no improvement with WelChol, mirtazapine, amitriptyline, dicyclomine, Xifaxan.  Has had improvement with low FODMAP diet but feels like she cannot eat much due to dietary triggers.  Given that she has not had an endoscopic evaluation in 7 years and has potentially not ever had an EGD, will pursue EGD and colonoscopy to assess for microscopic colitis, IBD, etc.  Does have unclear history of C. difficile but low suspicion for active C. difficile at this time so we will hold on infectious testing.  Suspect there is a significant functional component however has not responded to neuromodulators in the past so limited treatment options.  Did discuss we could consider treatment with Viberzi however careful risk/benefit evaluation is required given risk of pancreatitis and severe constipation/obstipation/bowel obstruction.  1. Visceral hypersensitivity syndrome  2. Functional intestinal disorder  - Ambulatory Referral to Gastroenterology    3. Chronic diarrhea  - Colonoscopy; Future  - EGD; Future  -Continue low FODMAP diet    4. Epigastric pain  Continue famotidine 20 mg twice daily.  Avoiding PPI given prior possible C. difficile  - EGD; Future    5. Unintentional weight loss  - Colonoscopy; Future  - EGD; Future        ______________________________________________________________________    HPI:    Had issues with cdif and e coli  infections.  Tried various treatments and eventually found out that things improved with taking Dificd.  Previously treated for EIA negative cdif.  No recent antibiotic exposure.    Previously diagnosed with microscopic colitis.    Was weaned off elavil, didn't think it helped.    Has done FODMAP, feels like she can't eat anything. Doesn't sleep well and doesn't eat well. Try trazadone but has issues with awakening around 3 am.     20# unintentional weight loss.  No blood in stool.    Feels like she can't eat anything except very bland foods.Symptoms worse over the past.   Has 5-8 episodes of diarrhea every morning that is related to whatever she ate the night before.  Has constant epigastric pain     No family history of IBD.  Paternal grandmother may have had colon cancer at a young age.    No prior EGD.    4/10/2024 hemoglobin 13.5, , B12 normal, iron panel unremarkable    Colonoscopy 1/12/2017 mild patchy edema, erythema, and loss of vascularity throughout the colon and rectum.  Mild cecal and proximal ascending colon friability.  Biopsies taken.  Could not intubate TI.  Medium sized hemorrhoids.    Pathology 1/12/17  Cecum and ascending colon biopsy colonic mucosal biopsies with minimal increase of lymphoplasmacytic inflammation in the LP and very patchy few increased intraepithelial lymphocytes.  Findings are not definitive for lymphocytic colitis  Hepatic flexure and transverse colon biopsy normal  Splenic flexure and descending colon biopsies few chronic inflammatory cells and no definite increase in intraepithelial lymphocytes  Sigmoid and rectum no significant histopathologic change present    Answers submitted by the patient for this visit:  Abdominal Pain Questionnaire (Submitted on 6/26/2024)  Chief Complaint: Abdominal pain  Chronicity: chronic  Onset: more than 1 year ago  Onset quality: sudden  Frequency: constantly  Episode duration: 5 Days  Progression since onset: waxing and waning  Pain  location: epigastric region  Pain - numeric: 10/10  Pain quality: cramping, sharp  Radiates to: suprapubic region  anorexia: Yes  arthralgias: Yes  belching: Yes  constipation: No  diarrhea: Yes  dysuria: No  fever: No  flatus: Yes  frequency: No  headaches: Yes  hematochezia: No  hematuria: No  melena: No  myalgias: No  nausea: Yes  weight loss: Yes  vomiting: Yes  Aggravated by: eating  Relieved by: certain positions    REVIEW OF SYSTEMS:    CONSTITUTIONAL: Denies any fever, chills, rigors, and weight loss.  HEENT: No earache or tinnitus. Denies hearing loss or visual disturbances.  CARDIOVASCULAR: No chest pain or palpitations.   RESPIRATORY: Denies any cough, hemoptysis, shortness of breath or dyspnea on exertion.  GASTROINTESTINAL: As noted in the History of Present Illness.   GENITOURINARY: No problems with urination. Denies any hematuria or dysuria.  NEUROLOGIC: No dizziness or vertigo, denies headaches.   MUSCULOSKELETAL: Denies any muscle or joint pain.   SKIN: Denies skin rashes or itching.   ENDOCRINE: Denies excessive thirst. Denies intolerance to heat or cold.  PSYCHOSOCIAL: Denies depression or anxiety. Denies any recent memory loss.       Historical Information   Past Medical History:   Diagnosis Date    C. difficile diarrhea     resolved 06 jun 2017    Depression     Elevated CEA     resolved 06 sep 2017    GERD (gastroesophageal reflux disease)     Headache(784.0)     Post herpetic neuralgia 9/18/2018    Reactive airway disease     RESOLVED 06 NOV 2017    Rotator cuff tear     Shingles     Sleep disorder     RESOLVED 06 NOV 2017    Urinary tract infection      Past Surgical History:   Procedure Laterality Date    BREAST BIOPSY      COLONOSCOPY      CYST REMOVAL  2022    2 cysts on head removed and biopsied    DILATION AND CURETTAGE OF UTERUS      FRACTURE SURGERY      ROTATOR CUFF REPAIR      TUBAL LIGATION      WRIST FRACTURE SURGERY       Social History   Social History     Substance and Sexual  "Activity   Alcohol Use Yes    Alcohol/week: 7.0 standard drinks of alcohol    Types: 7 Glasses of wine per week    Comment: daily  SOCIAL      Social History     Substance and Sexual Activity   Drug Use Never     Social History     Tobacco Use   Smoking Status Never   Smokeless Tobacco Never     Family History   Problem Relation Age of Onset    Hyperlipidemia Mother     Hypertension Mother     Heart disease Mother         CARDIAC DISORDER    Hearing loss Mother     Heart disease Father         CARDIAC DISORDER    Heart failure Father         CONGESTIVE HEART FAILURE    Stroke Father     Alcohol abuse Brother     Drug abuse Brother     Substance Abuse Brother        Meds/Allergies       Current Outpatient Medications:     cetirizine (ZyrTEC) 10 mg tablet    famotidine (PEPCID) 20 mg tablet    traZODone (DESYREL) 50 mg tablet    amitriptyline (ELAVIL) 10 mg tablet    Allergies   Allergen Reactions    Cephalexin GI Intolerance and Other (See Comments)     reports potential for cdiff which she had in the past and does not wish to take anything  that can potentially cause again    Sulfamethoxazole-Trimethoprim Other (See Comments)     reports potential for cdiff which she had in the past and does not wish to take anything  that can potentially cause again    Ciprofloxacin Nausea Only    Flagyl [Metronidazole] Nausea Only    Flexeril [Cyclobenzaprine]     Haemophilus B Polysaccharide Vaccine Hives     Annotation - [id number]: local rxtn w N/V 9/14    Onion - Food Allergy Hives    Morphine Nausea Only           Objective     Blood pressure 136/78, pulse 67, temperature 98.6 °F (37 °C), height 4' 10\" (1.473 m), weight 55.9 kg (123 lb 3.2 oz), SpO2 98%, currently breastfeeding. Body mass index is 25.75 kg/m².        PHYSICAL EXAM:      General Appearance:   Alert, cooperative, no distress   HEENT:   Normocephalic, atraumatic, anicteric.     Neck:  Supple, symmetrical, trachea midline   Lungs:   Clear to auscultation " bilaterally; no rales, rhonchi or wheezing; respirations unlabored    Heart::   Regular rate and rhythm; no murmur, rub, or gallop.   Abdomen:   Soft, non-tender, non-distended; normal bowel sounds; no masses, no organomegaly    Genitalia:   Deferred    Rectal:   Deferred    Extremities:  No cyanosis, clubbing or edema    Pulses:  2+ and symmetric    Skin:  No jaundice, rashes, or lesions    Lymph nodes:  No palpable cervical lymphadenopathy        Lab Results:   No visits with results within 1 Day(s) from this visit.   Latest known visit with results is:   Lab on 04/10/2024   Component Date Value    WBC 04/10/2024 4.69     RBC 04/10/2024 3.45 (L)     Hemoglobin 04/10/2024 13.5     Hematocrit 04/10/2024 36.0     MCV 04/10/2024 104 (H)     MCH 04/10/2024 39.1 (H)     MCHC 04/10/2024 37.5 (H)     RDW 04/10/2024 13.2     Platelets 04/10/2024 234     MPV 04/10/2024 10.3     Vit D, 25-Hydroxy 04/10/2024 52.5     Vitamin B-12 04/10/2024 237     Lyme Total Antibodies 04/10/2024 Negative     Iron Saturation 04/10/2024 53 (H)     TIBC 04/10/2024 294     Iron 04/10/2024 156     UIBC 04/10/2024 138 (L)     Ferritin 04/10/2024 49          Radiology Results:   No results found.

## 2024-06-26 NOTE — TELEPHONE ENCOUNTER
Procedure: EGD/Colonoscopy  Date: 07/25/2024  Physician performing: Dr. Fox  Location of procedure:  [place]  Instructions given to patient: Yes  Diabetic: No  Clearances: N/A

## 2024-06-27 RX ORDER — TRAZODONE HYDROCHLORIDE 50 MG/1
50 TABLET ORAL
Qty: 180 TABLET | Refills: 0 | Status: SHIPPED | OUTPATIENT
Start: 2024-06-27

## 2024-07-12 ENCOUNTER — TELEPHONE (OUTPATIENT)
Dept: GASTROENTEROLOGY | Facility: MEDICAL CENTER | Age: 69
End: 2024-07-12

## 2024-07-12 NOTE — TELEPHONE ENCOUNTER
Confirming Upcoming Procedure: EGD/Colonoscopy on 07/25/2024  Physician performing: Dr. Fox  Location of procedure:  [place]  Prep: Miralax